# Patient Record
Sex: FEMALE | Race: WHITE | NOT HISPANIC OR LATINO | Employment: FULL TIME | URBAN - METROPOLITAN AREA
[De-identification: names, ages, dates, MRNs, and addresses within clinical notes are randomized per-mention and may not be internally consistent; named-entity substitution may affect disease eponyms.]

---

## 2019-02-18 ENCOUNTER — RECORDS - HEALTHEAST (OUTPATIENT)
Dept: LAB | Facility: HOSPITAL | Age: 49
End: 2019-02-18

## 2019-02-18 LAB
ANION GAP SERPL CALCULATED.3IONS-SCNC: 9 MMOL/L (ref 5–18)
BUN SERPL-MCNC: 13 MG/DL (ref 8–22)
CALCIUM SERPL-MCNC: 9.3 MG/DL (ref 8.5–10.5)
CHLORIDE BLD-SCNC: 108 MMOL/L (ref 98–107)
CO2 SERPL-SCNC: 23 MMOL/L (ref 22–31)
CREAT SERPL-MCNC: 0.75 MG/DL (ref 0.6–1.1)
GFR SERPL CREATININE-BSD FRML MDRD: >60 ML/MIN/1.73M2
GLUCOSE BLD-MCNC: 88 MG/DL (ref 70–125)
POTASSIUM BLD-SCNC: 3.4 MMOL/L (ref 3.5–5)
SODIUM SERPL-SCNC: 140 MMOL/L (ref 136–145)

## 2019-02-20 LAB — TOPIRAMATE SERPL-MCNC: 3.9 UG/ML (ref 5–20)

## 2021-06-01 ENCOUNTER — RECORDS - HEALTHEAST (OUTPATIENT)
Dept: ADMINISTRATIVE | Facility: CLINIC | Age: 51
End: 2021-06-01

## 2021-06-02 ENCOUNTER — RECORDS - HEALTHEAST (OUTPATIENT)
Dept: ADMINISTRATIVE | Facility: CLINIC | Age: 51
End: 2021-06-02

## 2021-07-18 ENCOUNTER — OFFICE VISIT (OUTPATIENT)
Dept: FAMILY MEDICINE | Facility: CLINIC | Age: 51
End: 2021-07-18
Payer: COMMERCIAL

## 2021-07-18 VITALS
OXYGEN SATURATION: 95 % | SYSTOLIC BLOOD PRESSURE: 101 MMHG | DIASTOLIC BLOOD PRESSURE: 68 MMHG | WEIGHT: 164.56 LBS | HEART RATE: 100 BPM | TEMPERATURE: 98.9 F

## 2021-07-18 DIAGNOSIS — M25.561 ACUTE PAIN OF RIGHT KNEE: Primary | ICD-10-CM

## 2021-07-18 PROCEDURE — 99203 OFFICE O/P NEW LOW 30 MIN: CPT | Performed by: STUDENT IN AN ORGANIZED HEALTH CARE EDUCATION/TRAINING PROGRAM

## 2021-07-18 NOTE — PROGRESS NOTES
There are no exam notes on file for this visit.  Chief Complaint   Patient presents with     Knee Pain     R knee pain since this morning, swelling, no known injury     Blood pressure 101/68, pulse 100, temperature 98.9  F (37.2  C), temperature source Oral, weight 74.6 kg (164 lb 9 oz), SpO2 95 %.           SUBJECTIVE       Mindy is a 50 year old  female with a PMH significant for:   There is no problem list on file for this patient.    She presents with sudden onset, sharp right knee pain, first noticed this morning.  Symptoms have gradually gotten worse all day.  Denies any trauma or inciting events.  Has had knee pains in the past, but none this severe.  Describes playing catcher in softball and states that her knees hurt when the weather changes.  Has not tried anything for her symptoms.  Denies any numbness, tingling, weakness.  Does describe some bone-on-bone action with her patella.  Pain is worse on the medial posterior right knee.  Denies any fevers or chills.  No history of gout in the family.          OBJECTIVE     Vitals:    07/18/21 1750   BP: 101/68   BP Location: Left arm   Patient Position: Sitting   Cuff Size: Adult Regular   Pulse: 100   Temp: 98.9  F (37.2  C)   TempSrc: Oral   SpO2: 95%   Weight: 74.6 kg (164 lb 9 oz)     There is no height or weight on file to calculate BMI.    Constitutional: Awake, alert, cooperative, no acute distress, and appears stated age.  Musculoskeletal: Tenderness to palpation at the right patella and right joint line.  Medial and lateral ligaments intact.  Full right knee flexion/extension.  Strength intact.  Sensations intact.  Unsteady gait secondary to pain.  2+ DP pulses on the right.  Neuropsychiatric: Normal affect, mood, orientation, memory and insight.  Skin: No visible rashes, erythema, pallor, petechia or purpura.      ASSESSMENT AND PLAN     Mindy was seen today for knee pain.    Diagnoses and all orders for this visit:    Acute pain of right knee  Likely  due to soft tissue, ligament versus meniscus versus muscle injury.  Does not appear to be infected at this time.  Recommend rest, ice, compression, elevation in addition to Tylenol/ibuprofen and over-the-counter topicals as needed for pain control.  Recommend follow-up with PCP for further management and evaluation.  Consider physical therapy, and steroid injection, MRI.  Return precautions provided.        Return if symptoms worsen or fail to improve.    Malcolm Aguilar MD  7/18/2021

## 2023-05-10 ENCOUNTER — APPOINTMENT (OUTPATIENT)
Dept: CT IMAGING | Facility: HOSPITAL | Age: 53
End: 2023-05-10
Attending: EMERGENCY MEDICINE
Payer: COMMERCIAL

## 2023-05-10 ENCOUNTER — HOSPITAL ENCOUNTER (EMERGENCY)
Facility: HOSPITAL | Age: 53
Discharge: HOME | End: 2023-05-10
Attending: EMERGENCY MEDICINE | Admitting: EMERGENCY MEDICINE
Payer: COMMERCIAL

## 2023-05-10 VITALS
DIASTOLIC BLOOD PRESSURE: 74 MMHG | TEMPERATURE: 97.7 F | RESPIRATION RATE: 16 BRPM | OXYGEN SATURATION: 97 % | WEIGHT: 170 LBS | HEART RATE: 79 BPM | SYSTOLIC BLOOD PRESSURE: 110 MMHG

## 2023-05-10 DIAGNOSIS — E27.8 MASS OF BOTH ADRENAL GLANDS (H): ICD-10-CM

## 2023-05-10 DIAGNOSIS — N20.1 CALCULUS OF PROXIMAL RIGHT URETER: ICD-10-CM

## 2023-05-10 LAB
ALBUMIN SERPL BCG-MCNC: 4.2 G/DL (ref 3.5–5.2)
ALBUMIN UR-MCNC: 20 MG/DL
ALP SERPL-CCNC: 63 U/L (ref 35–104)
ALT SERPL W P-5'-P-CCNC: 14 U/L (ref 10–35)
ANION GAP SERPL CALCULATED.3IONS-SCNC: 12 MMOL/L (ref 7–15)
APPEARANCE UR: ABNORMAL
AST SERPL W P-5'-P-CCNC: 22 U/L (ref 10–35)
BILIRUB DIRECT SERPL-MCNC: <0.2 MG/DL (ref 0–0.3)
BILIRUB SERPL-MCNC: 0.3 MG/DL
BILIRUB UR QL STRIP: NEGATIVE
BUN SERPL-MCNC: 16.5 MG/DL (ref 6–20)
CALCIUM SERPL-MCNC: 9.4 MG/DL (ref 8.6–10)
CHLORIDE SERPL-SCNC: 106 MMOL/L (ref 98–107)
COLOR UR AUTO: YELLOW
CREAT SERPL-MCNC: 1.03 MG/DL (ref 0.51–0.95)
CRP SERPL-MCNC: <3 MG/L
DEPRECATED HCO3 PLAS-SCNC: 22 MMOL/L (ref 22–29)
ERYTHROCYTE [DISTWIDTH] IN BLOOD BY AUTOMATED COUNT: 13.2 % (ref 10–15)
GFR SERPL CREATININE-BSD FRML MDRD: 65 ML/MIN/1.73M2
GLUCOSE SERPL-MCNC: 121 MG/DL (ref 70–99)
GLUCOSE UR STRIP-MCNC: NEGATIVE MG/DL
HCT VFR BLD AUTO: 46.8 % (ref 35–47)
HGB BLD-MCNC: 15.9 G/DL (ref 11.7–15.7)
HGB UR QL STRIP: ABNORMAL
KETONES UR STRIP-MCNC: NEGATIVE MG/DL
LEUKOCYTE ESTERASE UR QL STRIP: NEGATIVE
LIPASE SERPL-CCNC: 23 U/L (ref 13–60)
MCH RBC QN AUTO: 31.3 PG (ref 26.5–33)
MCHC RBC AUTO-ENTMCNC: 34 G/DL (ref 31.5–36.5)
MCV RBC AUTO: 92 FL (ref 78–100)
MUCOUS THREADS #/AREA URNS LPF: PRESENT /LPF
NITRATE UR QL: NEGATIVE
PH UR STRIP: 7 [PH] (ref 5–7)
PLATELET # BLD AUTO: 323 10E3/UL (ref 150–450)
POTASSIUM SERPL-SCNC: 4.5 MMOL/L (ref 3.4–5.3)
PROT SERPL-MCNC: 7.1 G/DL (ref 6.4–8.3)
RBC # BLD AUTO: 5.08 10E6/UL (ref 3.8–5.2)
RBC URINE: >182 /HPF
SODIUM SERPL-SCNC: 140 MMOL/L (ref 136–145)
SP GR UR STRIP: 1.03 (ref 1–1.03)
SQUAMOUS EPITHELIAL: 12 /HPF
UROBILINOGEN UR STRIP-MCNC: <2 MG/DL
WBC # BLD AUTO: 14.4 10E3/UL (ref 4–11)
WBC URINE: 0 /HPF

## 2023-05-10 PROCEDURE — 82248 BILIRUBIN DIRECT: CPT | Performed by: EMERGENCY MEDICINE

## 2023-05-10 PROCEDURE — 36415 COLL VENOUS BLD VENIPUNCTURE: CPT | Performed by: EMERGENCY MEDICINE

## 2023-05-10 PROCEDURE — 86140 C-REACTIVE PROTEIN: CPT | Performed by: EMERGENCY MEDICINE

## 2023-05-10 PROCEDURE — 85027 COMPLETE CBC AUTOMATED: CPT | Performed by: EMERGENCY MEDICINE

## 2023-05-10 PROCEDURE — 96375 TX/PRO/DX INJ NEW DRUG ADDON: CPT

## 2023-05-10 PROCEDURE — 99285 EMERGENCY DEPT VISIT HI MDM: CPT | Mod: 25

## 2023-05-10 PROCEDURE — 83690 ASSAY OF LIPASE: CPT | Performed by: EMERGENCY MEDICINE

## 2023-05-10 PROCEDURE — 250N000013 HC RX MED GY IP 250 OP 250 PS 637: Performed by: EMERGENCY MEDICINE

## 2023-05-10 PROCEDURE — 80053 COMPREHEN METABOLIC PANEL: CPT | Performed by: EMERGENCY MEDICINE

## 2023-05-10 PROCEDURE — 81001 URINALYSIS AUTO W/SCOPE: CPT | Performed by: EMERGENCY MEDICINE

## 2023-05-10 PROCEDURE — 258N000003 HC RX IP 258 OP 636: Performed by: EMERGENCY MEDICINE

## 2023-05-10 PROCEDURE — 250N000011 HC RX IP 250 OP 636: Performed by: EMERGENCY MEDICINE

## 2023-05-10 PROCEDURE — 96361 HYDRATE IV INFUSION ADD-ON: CPT

## 2023-05-10 PROCEDURE — 96374 THER/PROPH/DIAG INJ IV PUSH: CPT | Mod: 59

## 2023-05-10 PROCEDURE — 74177 CT ABD & PELVIS W/CONTRAST: CPT

## 2023-05-10 RX ORDER — DIMENHYDRINATE 50 MG
50 TABLET,CHEWABLE ORAL EVERY 8 HOURS PRN
Qty: 21 TABLET | Refills: 0 | Status: SHIPPED | OUTPATIENT
Start: 2023-05-10 | End: 2023-08-09

## 2023-05-10 RX ORDER — ACETAMINOPHEN 325 MG/1
975 TABLET ORAL ONCE
Status: COMPLETED | OUTPATIENT
Start: 2023-05-10 | End: 2023-05-10

## 2023-05-10 RX ORDER — TAMSULOSIN HYDROCHLORIDE 0.4 MG/1
0.4 CAPSULE ORAL DAILY
Qty: 7 CAPSULE | Refills: 0 | Status: SHIPPED | OUTPATIENT
Start: 2023-05-10 | End: 2023-05-17

## 2023-05-10 RX ORDER — ACETAMINOPHEN 500 MG
1000 TABLET ORAL EVERY 6 HOURS PRN
Qty: 28 TABLET | Refills: 0 | Status: SHIPPED | OUTPATIENT
Start: 2023-05-10 | End: 2023-08-09

## 2023-05-10 RX ORDER — KETOROLAC TROMETHAMINE 15 MG/ML
15 INJECTION, SOLUTION INTRAMUSCULAR; INTRAVENOUS ONCE
Status: COMPLETED | OUTPATIENT
Start: 2023-05-10 | End: 2023-05-10

## 2023-05-10 RX ORDER — OXYCODONE HYDROCHLORIDE 5 MG/1
5 TABLET ORAL EVERY 6 HOURS PRN
Qty: 12 TABLET | Refills: 0 | Status: SHIPPED | OUTPATIENT
Start: 2023-05-10 | End: 2023-05-15

## 2023-05-10 RX ORDER — IOPAMIDOL 755 MG/ML
90 INJECTION, SOLUTION INTRAVASCULAR ONCE
Status: COMPLETED | OUTPATIENT
Start: 2023-05-10 | End: 2023-05-10

## 2023-05-10 RX ORDER — IBUPROFEN 600 MG/1
600 TABLET, FILM COATED ORAL EVERY 6 HOURS PRN
Qty: 28 TABLET | Refills: 0 | Status: SHIPPED | OUTPATIENT
Start: 2023-05-10 | End: 2023-08-09

## 2023-05-10 RX ADMIN — Medication 50 MG: at 13:55

## 2023-05-10 RX ADMIN — HYDROMORPHONE HYDROCHLORIDE 1 MG: 1 INJECTION, SOLUTION INTRAMUSCULAR; INTRAVENOUS; SUBCUTANEOUS at 13:55

## 2023-05-10 RX ADMIN — ACETAMINOPHEN 975 MG: 325 TABLET ORAL at 12:11

## 2023-05-10 RX ADMIN — IOPAMIDOL 90 ML: 755 INJECTION, SOLUTION INTRAVENOUS at 13:11

## 2023-05-10 RX ADMIN — SODIUM CHLORIDE 500 ML: 9 INJECTION, SOLUTION INTRAVENOUS at 13:54

## 2023-05-10 RX ADMIN — KETOROLAC TROMETHAMINE 15 MG: 15 INJECTION, SOLUTION INTRAMUSCULAR; INTRAVENOUS at 12:11

## 2023-05-10 ASSESSMENT — ACTIVITIES OF DAILY LIVING (ADL): ADLS_ACUITY_SCORE: 35

## 2023-05-10 NOTE — DISCHARGE INSTRUCTIONS
Please follow-up with the [facility] - they should call you to arrange appointment.    Return to the ER for worsening symptoms, worsening pain, persistent nausea or vomiting, inability to empty your bladder, fever or other concerns.    Do not drink alcohol or drive while using the oxycodone.

## 2023-05-10 NOTE — ED TRIAGE NOTES
Pt presents to triage ambulatory for flank pain. Pt reports sudden onset of severe R flank pain started around 0300 am. Pt reports blood in urine and increase urination. No hx of kidney stones. Hx of appendectomy. +N/V.

## 2023-05-10 NOTE — ED PROVIDER NOTES
Emergency Department Encounter     Evaluation Date & Time:   5/10/2023 11:44 AM    CHIEF COMPLAINT:  Flank Pain      Triage Note:Pt presents to triage ambulatory for flank pain. Pt reports sudden onset of severe R flank pain started around 0300 am. Pt reports blood in urine and increase urination. No hx of kidney stones. Hx of appendectomy. +N/V.          Impression and Plan       FINAL IMPRESSION:    ICD-10-CM    1. Calculus of proximal right ureter  N20.1 Adult Urology  Referral      2. Mass of both adrenal glands (H)  E27.8           ED COURSE & MEDICAL DECISION MAKIN:58 AM I met with the patient, obtained history, performed an initial exam, and discussed options and plan for diagnostics and treatment here in the ED.  2:44 PM We discussed the plan for discharge and the patient is agreeable. Reviewed supportive cares, symptomatic treatment, outpatient follow up, and reasons to return to the Emergency Department. Patient to be discharged by ED RN.     52 year old female, history of s/p appendectomy, adenomatous polyp of colon and tobacco use, who presents for evaluation of intense right flank pain that has been constant since waking her from sleep ~3am associated with multiple episodes of vomiting. She did note some hematuria this morning without other urinary symptoms. No fevers or chills.     On exam, abdomen is soft with mild tenderness to palpation RLQ and mild CVAT, right.    IV access established and blood sent for labs.  Patient given IV Toradol and po Tylenol with some improvement.  Patient then given Dramamine and Dilaudid with improvement.    Labs remarkable for leukocytosis (WBC 14.4) with normal CRP (<3).  She has polycythemia (Hb 15.9) and mild renal insufficiency (creatinine 1.03 with GFR 65) for which she was given IV fluids. No significant electrolyte derangements.  No laboratory evidence of hepatitis, biliary obstruction or pancreatitis.    UA contaminated with blood and no  Mom notified   suggestion of infection.    CT abdomen / pelvis demonstrated:  1.  Right proximal ureteral 5 mm stone with hydronephrosis.  2.  Bilateral indeterminate adrenal masses. MRI recommended.  3.  Probable multiple leiomyomas.    Patient discharged to home with follow-up with [facility] (a referral was ordered).  She was given prescriptions for ibuprofen, Tylenol, Dramamine, Flomax (given proximal location of stone) and oxycodone.  Return precautions provided.  Patient stable throughout ED course.    I phoned the patient at home to inform her of the adrenal masses incidentally seen on her imaging tonight and that she should arrange for further follow-up with her PCP for MRI.      At the conclusion of the encounter I discussed the results of all the tests and the disposition. The questions were answered. The patient acknowledged understanding and was agreeable with the care plan.      Medical Decision Making    History:    Supplemental history from: Documented in chart, if applicable    External Record(s) reviewed: Documented in chart, if applicable. and Prior Imaging: from 3/2012 - demonstrated left adrenal mass    Work Up:    Chart documentation includes differential considered and any EKGs or imaging independently interpreted by provider, where specified.    In additional to work up documented, I considered the following work up: Documented in chart, if applicable.    External consultation:    Discussion of management with another provider: Documented in chart, if applicable    Complicating factors:    Care impacted by chronic illness: Smoking / Nicotine Use    Care affected by social determinants of health: N/A    Disposition considerations: Discharge. I prescribed additional prescription strength medication(s) as charted. I considered admission, but ultimately discharged patient Given reassuring evaluation and clinical improvement.      MEDICATIONS GIVEN IN THE EMERGENCY DEPARTMENT:  Medications   ketorolac (TORADOL)  "injection 15 mg (15 mg Intravenous $Given 5/10/23 1211)   acetaminophen (TYLENOL) tablet 975 mg (975 mg Oral $Given 5/10/23 1211)   iopamidol (ISOVUE-370) solution 90 mL (90 mLs Intravenous $Given 5/10/23 1311)   HYDROmorphone (DILAUDID) injection 1 mg (1 mg Intravenous $Given 5/10/23 1355)   dimenhyDRINATE chew tab 50 mg (50 mg Oral $Given 5/10/23 1355)   0.9% sodium chloride BOLUS (0 mLs Intravenous Stopped 5/10/23 1441)       NEW PRESCRIPTIONS STARTED AT TODAY'S ED VISIT:  Discharge Medication List as of 5/10/2023  2:48 PM      START taking these medications    Details   acetaminophen (TYLENOL) 500 MG tablet Take 2 tablets (1,000 mg) by mouth every 6 hours as needed for pain, Disp-28 tablet, R-0, Local Print      dimenhyDRINATE (DRAMAMINE) 50 MG CHEW Take 1 tablet (50 mg) by mouth every 8 hours as needed for other (kidney stone pain), Disp-21 tablet, R-0, Local Print      ibuprofen (ADVIL/MOTRIN) 600 MG tablet Take 1 tablet (600 mg) by mouth every 6 hours as needed for moderate pain, Disp-28 tablet, R-0, Local Print      oxyCODONE (ROXICODONE) 5 MG tablet Take 1 tablet (5 mg) by mouth every 6 hours as needed for pain or moderate to severe pain, Disp-12 tablet, R-0, Local Print      tamsulosin (FLOMAX) 0.4 MG capsule Take 1 capsule (0.4 mg) by mouth daily for 7 doses, Disp-7 capsule, R-0, Local Print             HPI     HPI     Mindy Guardado is a 52 year old female, history of s/p appendectomy, adenomatous polyp of colon and tobacco use, who presents to this ED by walk-in for evaluation of flank pain.    The pain awoke the patient from sleep at 3:00 AM (~9 hours ago) and has been constant since onset. Her pain is located to her right flank and radiates toward her right abdomen. She describes her pain as \"intense.\" She denies provocative features. She endorses multiple (>12) episodes of vomiting. No diarrhea or constipation. The patient notes some hematuria this morning, but denies dysuria, urinary frequency and " urinary retention. No fevers or chills.     She has otherwise been in her usual state of health and denies chest pain, shortness of breath, cough or other concerns.     She is post-menopausal.     REVIEW OF SYSTEMS:  All other systems reviewed and are negative.      Medical History     History reviewed. No pertinent past medical history.    History reviewed. No pertinent surgical history.    History reviewed. No pertinent family history.    Social History     Tobacco Use     Smoking status: Every Day     Smokeless tobacco: Never       acetaminophen (TYLENOL) 500 MG tablet  dimenhyDRINATE (DRAMAMINE) 50 MG CHEW  ibuprofen (ADVIL/MOTRIN) 600 MG tablet  oxyCODONE (ROXICODONE) 5 MG tablet  tamsulosin (FLOMAX) 0.4 MG capsule        Physical Exam     First Vitals:  Patient Vitals for the past 24 hrs:   BP Temp Temp src Pulse Resp SpO2 Weight   05/10/23 1430 110/74 -- -- 79 -- 97 % --   05/10/23 1427 110/74 -- -- 74 -- 96 % --   05/10/23 1415 108/67 -- -- 78 -- 95 % --   05/10/23 1400 121/67 -- -- 82 16 99 % --   05/10/23 1345 114/65 -- -- 81 -- 97 % --   05/10/23 1330 114/68 -- -- 77 -- 97 % --   05/10/23 1315 128/76 -- -- -- -- -- --   05/10/23 1245 110/70 -- -- 82 -- 93 % --   05/10/23 1230 111/77 -- -- 80 -- 97 % --   05/10/23 1215 115/86 -- -- 83 -- 98 % --   05/10/23 1200 103/55 -- -- 88 -- 95 % --   05/10/23 1140 (!) 143/78 97.7  F (36.5  C) Oral 96 16 96 % 77.1 kg (170 lb)       PHYSICAL EXAM:   Physical Exam    GENERAL: Awake, alert.  In moderate acute distress secondary to flank pain.   HEENT: Normocephalic, atraumatic.  NECK: No stridor.  PULMONARY: Symmetrical breath sounds without distress.  Lungs clear to auscultation bilaterally without wheezes, rhonchi or rales.  CARDIO: Regular rate and rhythm.  No significant murmur, rub or gallop.  Radial pulses strong and symmetrical.  ABDOMINAL: Abdomen soft, non-distended with mild tenderness to palpation RLQ; no rebound tenderness or guarding. Mild CVAT, right; no  CVAT, left.  EXTREMITIES: No lower extremity swelling or edema.      NEURO: Alert and oriented to person, place and time.  Cranial nerves grossly intact.  No focal motor deficit.  PSYCH: Normal mood and affect.      Results     LAB:  All pertinent labs reviewed and interpreted  Labs Ordered and Resulted from Time of ED Arrival to Time of ED Departure   CBC WITH PLATELETS - Abnormal       Result Value    WBC Count 14.4 (*)     RBC Count 5.08      Hemoglobin 15.9 (*)     Hematocrit 46.8      MCV 92      MCH 31.3      MCHC 34.0      RDW 13.2      Platelet Count 323     BASIC METABOLIC PANEL - Abnormal    Sodium 140      Potassium 4.5      Chloride 106      Carbon Dioxide (CO2) 22      Anion Gap 12      Urea Nitrogen 16.5      Creatinine 1.03 (*)     Calcium 9.4      Glucose 121 (*)     GFR Estimate 65     ROUTINE UA WITH MICROSCOPIC REFLEX TO CULTURE - Abnormal    Color Urine Yellow      Appearance Urine Turbid (*)     Glucose Urine Negative      Bilirubin Urine Negative      Ketones Urine Negative      Specific Gravity Urine 1.032 (*)     Blood Urine >1.0 mg/dL (*)     pH Urine 7.0      Protein Albumin Urine 20 (*)     Urobilinogen Urine <2.0      Nitrite Urine Negative      Leukocyte Esterase Urine Negative      Mucus Urine Present (*)     RBC Urine >182 (*)     WBC Urine 0      Squamous Epithelials Urine 12 (*)    HEPATIC FUNCTION PANEL - Normal    Protein Total 7.1      Albumin 4.2      Bilirubin Total 0.3      Alkaline Phosphatase 63      AST 22      ALT 14      Bilirubin Direct <0.20     LIPASE - Normal    Lipase 23     CRP INFLAMMATION - Normal    CRP Inflammation <3.00         RADIOLOGY:  CT Abdomen Pelvis w Contrast   Final Result   IMPRESSION:    1.  Right proximal ureteral 5 mm stone with hydronephrosis.   2.  Bilateral indeterminate adrenal masses. MRI recommended.   3.  Probable multiple leiomyomas.          Song KUMARI Cha am serving as a scribe to document services personally performed by Becky Marks  MD based on my observation and the provider's statements to me. I, Becky Marks MD attest that Song Palencia is acting in a scribe capacity, has observed my performance of the services and has documented them in accordance with my direction.    Becky Marks MD  Emergency Medicine  [facility] EMERGENCY DEPARTMENT         Becky Marks MD  05/10/23 2034

## 2023-05-11 ENCOUNTER — VIRTUAL VISIT (OUTPATIENT)
Dept: UROLOGY | Facility: CLINIC | Age: 53
End: 2023-05-11
Payer: COMMERCIAL

## 2023-05-11 VITALS — WEIGHT: 170 LBS | BODY MASS INDEX: 31.28 KG/M2 | HEIGHT: 62 IN

## 2023-05-11 DIAGNOSIS — N20.1 CALCULUS OF PROXIMAL RIGHT URETER: ICD-10-CM

## 2023-05-11 PROCEDURE — 99204 OFFICE O/P NEW MOD 45 MIN: CPT | Mod: VID | Performed by: STUDENT IN AN ORGANIZED HEALTH CARE EDUCATION/TRAINING PROGRAM

## 2023-05-11 RX ORDER — CEFAZOLIN SODIUM 2 G/50ML
2 SOLUTION INTRAVENOUS SEE ADMIN INSTRUCTIONS
Status: CANCELLED | OUTPATIENT
Start: 2023-05-11

## 2023-05-11 RX ORDER — CEFAZOLIN SODIUM 2 G/50ML
2 SOLUTION INTRAVENOUS
Status: CANCELLED | OUTPATIENT
Start: 2023-05-11

## 2023-05-11 ASSESSMENT — PAIN SCALES - GENERAL: PAINLEVEL: NO PAIN (0)

## 2023-05-11 NOTE — LETTER
5/11/2023       RE: Mindy Guardado  [address] [place] [address]     Dear Colleague,    Thank you for referring your patient, Mindy Guardado, to the [facility] [place] [facility]. Please see a copy of my visit note below.    **TEXT LINK TO CELL, [phone]**      Mindy is a 52 year old who is being evaluated via a billable video visit.      How would you like to obtain your AVS? Mail    If the video visit is dropped, the invitation should be resent by: Text to cell phone:     Will anyone else be joining your video visit? No           Video-Visit Details    Type of service:  Video Visit  Video start time: 3:01 PM  Video end time: 3:14 PM    Originating Location (pt. Location): Home    Distant Location (provider location):  On-site  Platform used for Video Visit: Flor         Chief Complaint:    Kidney/Ureteral Stone           Consult or Referral:     Mr. Mindy Guardado is a 52 year old female seen at the request of Dr. Marks.         History of Present Illness:    Mindy Guardado is a very pleasant 52 year old female who presents with a history of Kidney/Ureteral Stone.      Reviewed previous notes from Dr. Selina Guillen presents today after recent ER visit due to ureteral colic on the right side  She has no prior history of kidney or ureteral stones  She is not currently taking any medications apart from pain medications and tamsulosin  No history of urinary tract infection  She needs regular pain medications             Past Medical History:   History reviewed. No pertinent past medical history.         Past Surgical History:   History reviewed. No pertinent surgical history.         Medications     Current Outpatient Medications   Medication    acetaminophen (TYLENOL) 500 MG tablet    dimenhyDRINATE (DRAMAMINE) 50 MG CHEW    ibuprofen (ADVIL/MOTRIN) 600 MG tablet    oxyCODONE (ROXICODONE) 5 MG tablet    tamsulosin  (FLOMAX) 0.4 MG capsule     No current facility-administered medications for this visit.            Family History:   History reviewed. No pertinent family history.         Social History:     Social History     Socioeconomic History    Marital status:      Spouse name: Not on file    Number of children: Not on file    Years of education: Not on file    Highest education level: Not on file   Occupational History    Not on file   Tobacco Use    Smoking status: Every Day    Smokeless tobacco: Never   Vaping Use    Vaping status: Not on file   Substance and Sexual Activity    Alcohol use: Not on file    Drug use: Not on file    Sexual activity: Not on file   Other Topics Concern    Not on file   Social History Narrative    Not on file     Social Determinants of Health     Financial Resource Strain: Not on file   Food Insecurity: Not on file   Transportation Needs: Not on file   Physical Activity: Not on file   Stress: Not on file   Social Connections: Not on file   Intimate Partner Violence: Not on file   Housing Stability: Not on file            Allergies:   Penicillins         Review of Systems:  From intake questionnaire     Skin: negative  Eyes: negative  Ears/Nose/Throat: negative  Respiratory: No shortness of breath, dyspnea on exertion, cough, or hemoptysis  Cardiovascular: No chest pain or palpitations  Gastrointestinal: negative; no nausea/vomiting, constipation or diarrhea  Genitourinary: as per HPI  Musculoskeletal: negative  Neurologic: negative  Psychiatric: negative  Hematologic/Lymphatic/Immunologic: negative  Endocrine: negative         Physical Exam:   This is a virtual visit    Alert, no acute distress, oriented, conversant    Ears/nose/mouth: mouth:normal, good dentition  Respiratory: no respiratory distress, or pursed lip breathing  Cardiovascular:no obvious jugular venous distension present  Skin: no suspicious lesions or rashes on Visible body parts on the Screen  Neuro: Alert, oriented,  speech and mentation normal  Psych: affect and mood normal, alert and oriented to person, place and time      Labs and Pathology:    The following labs were reviewed by me and discussed with the patient:  Creatinine: Normal  UA:  Hematuria present  Significant for   Lab Results   Component Value Date    CR 1.03 05/10/2023    CR 0.75 02/18/2019     No results found for: PSA          Imaging:    The following imaging exams were independently viewed and interpreted by me and discussed with patient:  CT Scan Abd/Pelvis: Abnormal:   Right mild hydro ureteral nephrosis with a proximal ureteral calculus measuring 7 mm in greatest dimension             Assessment and Plan:     Assessment:     Calculus of proximal right ureter  Discussed in detail about the significance of the ureteral stone and the need for intervention considering the large size stone and her persistent pain  Recommended that she continue taking tamsulosin for now and will have her scheduled for the ureteroscopy  We discussed in detail about the procedure of the ureteroscopy and the need for a stent placement after removal of the stone.  We also discussed about stent related discomfort and follow-up visit for stent removal in the clinic  Postoperative outcomes were discussed including the possibilities of urinary tract infection, hematuria, stent related discomfort  Based on the discussions we had she was agreeable to proceed ahead with this scheduling of surgery  She will continue to strain her urine and inform us earlier if she is able to pass the stone on her own    - Adult Urology  Referral  - Case Request: CYSTOURETEROSCOPY, WITH Retrograde Pyelogram Laser Lithotripsy CALCULUS REMOVAL AND URETERAL STENT INSERTION; Standing  - Case Request: CYSTOURETEROSCOPY, WITH Retrograde Pyelogram Laser Lithotripsy CALCULUS REMOVAL AND URETERAL STENT INSERTION    Plan:  Scheduled for surgery    Orders  Orders Placed This Encounter   Procedures    Case  Request: CYSTOURETEROSCOPY, WITH Retrograde Pyelogram Laser Lithotripsy CALCULUS REMOVAL AND URETERAL STENT INSERTION         Ron Treviño MD  [facility] UROLOGY [facility]IN[facility] [place]                  ==========================    Additional Billing and Coding Information:  Review of external notes as documented above   Review of the result(s) of each unique test - UA, creatinine    Independent interpretation of a test performed by another physician/other qualified health care professional (not separately reported) -   CT abdomen pelvis    Discussion of management or test interpretation with external physician/other qualified healthcare professional/appropriate source -           20 minutes spent by me on the date of the encounter doing chart review, review of test results, interpretation of tests, patient visit and documentation     ==========================

## 2023-05-11 NOTE — PROGRESS NOTES
**TEXT LINK TO CELL, [phone]**      Mindy is a 52 year old who is being evaluated via a billable video visit.      How would you like to obtain your AVS? Mail    If the video visit is dropped, the invitation should be resent by: Text to cell phone:     Will anyone else be joining your video visit? No           Video-Visit Details    Type of service:  Video Visit  Video start time: 3:01 PM  Video end time: 3:14 PM    Originating Location (pt. Location): Home    Distant Location (provider location):  On-site  Platform used for Video Visit: Bahu         Chief Complaint:    Kidney/Ureteral Stone           Consult or Referral:     Mr. Mindy Guardado is a 52 year old female seen at the request of Dr. Marks.         History of Present Illness:    Mindy Guardado is a very pleasant 52 year old female who presents with a history of Kidney/Ureteral Stone.      Reviewed previous notes from Dr. Selina Guillen presents today after recent ER visit due to ureteral colic on the right side  She has no prior history of kidney or ureteral stones  She is not currently taking any medications apart from pain medications and tamsulosin  No history of urinary tract infection  She needs regular pain medications             Past Medical History:   History reviewed. No pertinent past medical history.         Past Surgical History:   History reviewed. No pertinent surgical history.         Medications     Current Outpatient Medications   Medication     acetaminophen (TYLENOL) 500 MG tablet     dimenhyDRINATE (DRAMAMINE) 50 MG CHEW     ibuprofen (ADVIL/MOTRIN) 600 MG tablet     oxyCODONE (ROXICODONE) 5 MG tablet     tamsulosin (FLOMAX) 0.4 MG capsule     No current facility-administered medications for this visit.            Family History:   History reviewed. No pertinent family history.         Social History:     Social History     Socioeconomic History     Marital status:      Spouse name: Not on file     Number of  children: Not on file     Years of education: Not on file     Highest education level: Not on file   Occupational History     Not on file   Tobacco Use     Smoking status: Every Day     Smokeless tobacco: Never   Vaping Use     Vaping status: Not on file   Substance and Sexual Activity     Alcohol use: Not on file     Drug use: Not on file     Sexual activity: Not on file   Other Topics Concern     Not on file   Social History Narrative     Not on file     Social Determinants of Health     Financial Resource Strain: Not on file   Food Insecurity: Not on file   Transportation Needs: Not on file   Physical Activity: Not on file   Stress: Not on file   Social Connections: Not on file   Intimate Partner Violence: Not on file   Housing Stability: Not on file            Allergies:   Penicillins         Review of Systems:  From intake questionnaire     Skin: negative  Eyes: negative  Ears/Nose/Throat: negative  Respiratory: No shortness of breath, dyspnea on exertion, cough, or hemoptysis  Cardiovascular: No chest pain or palpitations  Gastrointestinal: negative; no nausea/vomiting, constipation or diarrhea  Genitourinary: as per HPI  Musculoskeletal: negative  Neurologic: negative  Psychiatric: negative  Hematologic/Lymphatic/Immunologic: negative  Endocrine: negative         Physical Exam:   This is a virtual visit    Alert, no acute distress, oriented, conversant    Ears/nose/mouth: mouth:normal, good dentition  Respiratory: no respiratory distress, or pursed lip breathing  Cardiovascular:no obvious jugular venous distension present  Skin: no suspicious lesions or rashes on Visible body parts on the Screen  Neuro: Alert, oriented, speech and mentation normal  Psych: affect and mood normal, alert and oriented to person, place and time      Labs and Pathology:    The following labs were reviewed by me and discussed with the patient:  Creatinine: Normal  UA:  Hematuria present  Significant for   Lab Results   Component  Value Date    CR 1.03 05/10/2023    CR 0.75 02/18/2019     No results found for: PSA          Imaging:    The following imaging exams were independently viewed and interpreted by me and discussed with patient:  CT Scan Abd/Pelvis: Abnormal:   Right mild hydro ureteral nephrosis with a proximal ureteral calculus measuring 7 mm in greatest dimension             Assessment and Plan:     Assessment:     Calculus of proximal right ureter  Discussed in detail about the significance of the ureteral stone and the need for intervention considering the large size stone and her persistent pain  Recommended that she continue taking tamsulosin for now and will have her scheduled for the ureteroscopy  We discussed in detail about the procedure of the ureteroscopy and the need for a stent placement after removal of the stone.  We also discussed about stent related discomfort and follow-up visit for stent removal in the clinic  Postoperative outcomes were discussed including the possibilities of urinary tract infection, hematuria, stent related discomfort  Based on the discussions we had she was agreeable to proceed ahead with this scheduling of surgery  She will continue to strain her urine and inform us earlier if she is able to pass the stone on her own    - Adult Urology  Referral  - Case Request: CYSTOURETEROSCOPY, WITH Retrograde Pyelogram Laser Lithotripsy CALCULUS REMOVAL AND URETERAL STENT INSERTION; Standing  - Case Request: CYSTOURETEROSCOPY, WITH Retrograde Pyelogram Laser Lithotripsy CALCULUS REMOVAL AND URETERAL STENT INSERTION    Plan:  Scheduled for surgery    Orders  Orders Placed This Encounter   Procedures     Case Request: CYSTOURETEROSCOPY, WITH Retrograde Pyelogram Laser Lithotripsy CALCULUS REMOVAL AND URETERAL STENT INSERTION         Ron Treviño MD  [facility] UROLOGY [facility]                  ==========================    Additional Billing and Coding Information:  Review of  external notes as documented above   Review of the result(s) of each unique test - UA, creatinine    Independent interpretation of a test performed by another physician/other qualified health care professional (not separately reported) -   CT abdomen pelvis    Discussion of management or test interpretation with external physician/other qualified healthcare professional/appropriate source -           20 minutes spent by me on the date of the encounter doing chart review, review of test results, interpretation of tests, patient visit and documentation     ==========================

## 2023-05-12 ENCOUNTER — TELEPHONE (OUTPATIENT)
Dept: UROLOGY | Facility: CLINIC | Age: 53
End: 2023-05-12
Payer: COMMERCIAL

## 2023-05-12 DIAGNOSIS — N20.1 CALCULUS OF PROXIMAL RIGHT URETER: Primary | ICD-10-CM

## 2023-05-12 NOTE — TELEPHONE ENCOUNTER
[facility] Call Center    Phone Message    May a detailed message be left on voicemail: yes     Reason for Call: Other: .  Pt calling to schedule surgery with [facility]. Also pt states medication for kidney stone pain she received is not working. Please call pt     Action Taken: Message routed to:  Other: Uro    Travel Screening: Not Applicable

## 2023-05-12 NOTE — TELEPHONE ENCOUNTER
Spoke with patient, she's having 9.5/10 right flank pain.  Reports gross hematuria since 5/10/23 after discharging home from the ED.  Taking Oxycodone 5 mg q6h for pain along with rotating Tylenol 1000 mg and Ibuprofen 600 mg q6h which isn't helping much.  Patient is also taking Flomax 0.4 mg daily.  Patient denies any fever/chills, dysuria or urinary frequency/urgency.  Patient has surgery orders in for kidney stone removal.  Message routed to Dr. Treviño to further advise.  Message routed to surgery schedulers to schedule surgery.  Patient states understanding.    Deyanira Brown, RN, BSN  [facility]

## 2023-05-15 DIAGNOSIS — N20.1 CALCULUS OF PROXIMAL RIGHT URETER: Primary | ICD-10-CM

## 2023-05-15 RX ORDER — KETOROLAC TROMETHAMINE 10 MG/1
10 TABLET, FILM COATED ORAL EVERY 6 HOURS PRN
Qty: 20 TABLET | Refills: 0 | Status: CANCELLED | OUTPATIENT
Start: 2023-05-15

## 2023-05-15 RX ORDER — OXYCODONE HYDROCHLORIDE 5 MG/1
5 TABLET ORAL EVERY 6 HOURS PRN
Qty: 10 TABLET | Refills: 0 | Status: CANCELLED | OUTPATIENT
Start: 2023-05-15 | End: 2023-05-18

## 2023-05-15 RX ORDER — OXYCODONE HYDROCHLORIDE 5 MG/1
5 TABLET ORAL EVERY 6 HOURS PRN
Qty: 12 TABLET | Refills: 0 | Status: SHIPPED | OUTPATIENT
Start: 2023-05-15 | End: 2023-05-23

## 2023-05-15 RX ORDER — KETOROLAC TROMETHAMINE 10 MG/1
10 TABLET, FILM COATED ORAL EVERY 6 HOURS PRN
Qty: 20 TABLET | Refills: 0 | Status: SHIPPED | OUTPATIENT
Start: 2023-05-15 | End: 2023-05-19

## 2023-05-17 ENCOUNTER — TELEPHONE (OUTPATIENT)
Dept: UROLOGY | Facility: CLINIC | Age: 53
End: 2023-05-17

## 2023-05-17 ENCOUNTER — TELEPHONE (OUTPATIENT)
Dept: UROLOGY | Facility: CLINIC | Age: 53
End: 2023-05-17
Payer: COMMERCIAL

## 2023-05-17 NOTE — TELEPHONE ENCOUNTER
Spoke with: Patient       Date of surgery: Tuesday May 23 2023       Location: [facility]      Informed patient they will need a adult : YES      Pre op with provider: Patient will call to set up with PCP through [facility]      H&P Scheduled in PAC-NA        Pre procedure covid : NA      Additional imaging: NA        Surgery Packet : Patient given location infor over phone surgery nurse will jennifer patient with more details     Additional comments:Please call with surgery teaching

## 2023-05-19 ENCOUNTER — TELEPHONE (OUTPATIENT)
Dept: UROLOGY | Facility: CLINIC | Age: 53
End: 2023-05-19
Payer: COMMERCIAL

## 2023-05-19 DIAGNOSIS — R11.0 NAUSEA: Primary | ICD-10-CM

## 2023-05-19 RX ORDER — ONDANSETRON 4 MG/1
4 TABLET, ORALLY DISINTEGRATING ORAL EVERY 8 HOURS PRN
Qty: 20 TABLET | Refills: 0 | Status: SHIPPED | OUTPATIENT
Start: 2023-05-19 | End: 2023-08-09

## 2023-05-19 NOTE — TELEPHONE ENCOUNTER
Spoke with patient and completed surgery teaching.  Reviewed time, date and address of procedure.    Length of procedure and what to expect after, phone number given for any questions.  Went over no eating drinking, may have clear liquids 3 hours prior to procedure.  Shower in morning before procedure.  Patient will need a ride home and someone to stay overnight.  Patient has pre-op physical scheduled.  Lakshmi Alvarez RN

## 2023-05-22 ENCOUNTER — TELEPHONE (OUTPATIENT)
Dept: UROLOGY | Facility: CLINIC | Age: 53
End: 2023-05-22

## 2023-05-22 ENCOUNTER — ANESTHESIA EVENT (OUTPATIENT)
Dept: SURGERY | Facility: AMBULATORY SURGERY CENTER | Age: 53
End: 2023-05-22
Payer: COMMERCIAL

## 2023-05-22 ENCOUNTER — OFFICE VISIT (OUTPATIENT)
Dept: FAMILY MEDICINE | Facility: CLINIC | Age: 53
End: 2023-05-22
Payer: COMMERCIAL

## 2023-05-22 VITALS
BODY MASS INDEX: 30.36 KG/M2 | HEART RATE: 77 BPM | DIASTOLIC BLOOD PRESSURE: 86 MMHG | SYSTOLIC BLOOD PRESSURE: 134 MMHG | TEMPERATURE: 97.6 F | HEIGHT: 62 IN | OXYGEN SATURATION: 97 % | WEIGHT: 165 LBS | RESPIRATION RATE: 16 BRPM

## 2023-05-22 DIAGNOSIS — Z01.818 PREOP GENERAL PHYSICAL EXAM: Primary | ICD-10-CM

## 2023-05-22 DIAGNOSIS — E27.8 ADRENAL MASS (H): ICD-10-CM

## 2023-05-22 DIAGNOSIS — N20.0 CALCULUS OF KIDNEY: ICD-10-CM

## 2023-05-22 PROCEDURE — 99214 OFFICE O/P EST MOD 30 MIN: CPT | Performed by: NURSE PRACTITIONER

## 2023-05-22 NOTE — TELEPHONE ENCOUNTER
[facility] Call Center    Phone Message    May a detailed message be left on voicemail: yes     Reason for Call: Ramya Guardado APRN CNP, called stating the patient was in for pre-op physical for her procedure tomorrow. The patient is reporting no symptoms at this time, and they are wondering if the patient should have imaging done prior? Writer not able to connect to back line. Please contact Ramya on her cell at [phone]. Thank you.    Action Taken: Message routed to:  Other: Urology    Travel Screening: Not Applicable

## 2023-05-22 NOTE — PROGRESS NOTES
[facility]  [id number]  [facility] [address]  Phone: [phone]  Primary Provider: No Ref-Primary, Physician  Pre-op Performing Provider: WAQAR TESFAYE      PREOPERATIVE EVALUATION:  Today's date: 5/22/2023    Mindy Guardado is a 52 year old female who presents for a preoperative evaluation.      5/22/2023    12:37 PM   Additional Questions   Roomed by Janet GALVEZ     Surgical Information:  Surgery/Procedure:  CYSTOURETEROSCOPY, WITH Retrograde Pyelogram Laser Lithotripsy CALCULUS REMOVAL AND URETERAL STENT INSERTION  Surgery Location: [facility]   Surgeon: Dr. Treviño    Surgery Date: 5/23/23  Time of Surgery: 0945  Where patient plans to recover: At home with family  Fax number for surgical facility: Note does not need to be faxed, will be available electronically in Epic.    Assessment & Plan     The proposed surgical procedure is considered INTERMEDIATE risk.    Preop general physical exam      Calculus of kidney  - currently asymptomatic     Adrenal mass (H)    - MR Adrenal wo and w Contrast  - Adult Endocrinology  Referral      Antiplatelet or Anticoagulation Medication Instructions:   - Patient is on no antiplatelet or anticoagulation medications.    Additional Medication Instructions:  Patient is on no additional chronic medications    RECOMMENDATION:  APPROVAL GIVEN to proceed with proposed procedure, without further diagnostic evaluation.            Subjective       HPI related to upcoming procedure: Calculus of proximal right ureter         5/22/2023    12:26 PM   Preop Questions   1. Have you ever had a heart attack or stroke? No   2. Have you ever had surgery on your heart or blood vessels, such as a stent placement, a coronary artery bypass, or surgery on an artery in your head, neck, heart, or legs? No   3. Do you have chest pain with activity? No   4. Do you have a history of  heart failure? No   5. Do you currently have a cold, bronchitis or  symptoms of other infection? No   6. Do you have a cough, shortness of breath, or wheezing? No   7. Do you or anyone in your family have previous history of blood clots? No   8. Do you or does anyone in your family have a serious bleeding problem such as prolonged bleeding following surgeries or cuts? No   9. Have you ever had problems with anemia or been told to take iron pills? No   10. Have you had any abnormal blood loss such as black, tarry or bloody stools, or abnormal vaginal bleeding? No   11. Have you ever had a blood transfusion? No   12. Are you willing to have a blood transfusion if it is medically needed before, during, or after your surgery? NO -    13. Have you or any of your relatives ever had problems with anesthesia? No   14. Do you have sleep apnea, excessive snoring or daytime drowsiness? No   15. Do you have any artifical heart valves or other implanted medical devices like a pacemaker, defibrillator, or continuous glucose monitor? No   16. Do you have artificial joints? No   17. Are you allergic to latex? No   18. Is there any chance that you may be pregnant? No       Health Care Directive:  Patient does not have a Health Care Directive or Living Will: Discussed advance care planning with patient; however, patient declined at this time.    Preoperative Review of :   reviewed - controlled substances reflected in medication list.      Status of Chronic Conditions:  See problem list for active medical problems.  Problems all longstanding and stable, except as noted/documented.  See ROS for pertinent symptoms related to these conditions.      Review of Systems  CONSTITUTIONAL: NEGATIVE for fever, chills, change in weight  INTEGUMENTARY/SKIN: NEGATIVE for worrisome rashes, moles or lesions  EYES: NEGATIVE for vision changes or irritation  ENT/MOUTH: NEGATIVE for ear, mouth and throat problems  RESP: NEGATIVE for significant cough or SOB  CV: NEGATIVE for chest pain, palpitations or peripheral  edema  GI: NEGATIVE for nausea, abdominal pain, heartburn, or change in bowel habits  : NEGATIVE for frequency, dysuria, or hematuria  MUSCULOSKELETAL: NEGATIVE for significant arthralgias or myalgia  NEURO: NEGATIVE for weakness, dizziness or paresthesias  ENDOCRINE: NEGATIVE for temperature intolerance, skin/hair changes  HEME: NEGATIVE for bleeding problems  PSYCHIATRIC: NEGATIVE for changes in mood or affect    Patient Active Problem List    Diagnosis Date Noted     Calculus of proximal right ureter 05/11/2023     Priority: Medium     Added automatically from request for surgery [id number]        Past Medical History:   Diagnosis Date     PONV (postoperative nausea and vomiting)      Renal disease      No past surgical history on file.  Current Outpatient Medications   Medication Sig Dispense Refill     acetaminophen (TYLENOL) 500 MG tablet Take 2 tablets (1,000 mg) by mouth every 6 hours as needed for pain 28 tablet 0     dimenhyDRINATE (DRAMAMINE) 50 MG CHEW Take 1 tablet (50 mg) by mouth every 8 hours as needed for other (kidney stone pain) 21 tablet 0     ibuprofen (ADVIL/MOTRIN) 600 MG tablet Take 1 tablet (600 mg) by mouth every 6 hours as needed for moderate pain 28 tablet 0     ondansetron (ZOFRAN ODT) 4 MG ODT tab Take 1 tablet (4 mg) by mouth every 8 hours as needed for nausea 20 tablet 0     oxyCODONE (ROXICODONE) 5 MG tablet Take 1 tablet (5 mg) by mouth every 6 hours as needed for pain or moderate to severe pain 12 tablet 0       Allergies   Allergen Reactions     Penicillins      Shellfish-Derived Products Hives     Azithromycin Rash     Possible allergy. Urticaria, rash 2 days after finishing Z-lexa treated at  ED  Possible allergy. Urticaria, rash 2 days after finishing Z-lexa treated at  ED       Metronidazole Rash     Flagyl  Flagyl       Oxycodone-Acetaminophen Rash and Nausea and Vomiting     Other reaction(s): Vomiting          Social History     Tobacco Use     Smoking status: Every Day     " Packs/day: 0.30     Types: Cigarettes     Smokeless tobacco: Never   Vaping Use     Vaping status: Never Used   Substance Use Topics     Alcohol use: Not on file       History   Drug Use Unknown         Objective     /86   Pulse 77   Temp 97.6  F (36.4  C) (Tympanic)   Resp 16   Ht 1.575 m (5' 2\")   Wt 74.8 kg (165 lb)   SpO2 97%   BMI 30.18 kg/m      Physical Exam    GENERAL APPEARANCE: healthy, alert and no distress     EYES: Eyes grossly normal to inspection and conjunctivae and sclerae normal     HENT: oral mucous membranes moist and oropharynx clear     NECK: no adenopathy, no asymmetry, masses, or scars and thyroid normal to palpation     RESP: lungs clear to auscultation - no rales, rhonchi or wheezes     CV: regular rates and rhythm, normal S1 S2, no S3 or S4 and no murmur, click or rub     ABDOMEN: soft, non-tender, no distention      MS: extremities normal- no gross deformities noted, no evidence of inflammation in joints, FROM in all extremities.     SKIN: no suspicious lesions or rashes     NEURO: Normal strength and tone, sensory exam grossly normal, mentation intact and speech normal     PSYCH: mentation appears normal. and affect normal/bright     LYMPHATICS: No cervical adenopathy    Recent Labs   Lab Test 05/10/23  1206   HGB 15.9*         POTASSIUM 4.5   CR 1.03*        Diagnostics:  No labs were ordered during this visit.   No EKG required, no history of coronary heart disease, significant arrhythmia, peripheral arterial disease or other structural heart disease.    Revised Cardiac Risk Index (RCRI):  The patient has the following serious cardiovascular risks for perioperative complications:   - No serious cardiac risks = 0 points     RCRI Interpretation: 0 points: Class I (very low risk - 0.4% complication rate)           Signed Electronically by: ALBERTINA Ontiveros CNP  Copy of this evaluation report is provided to requesting physician.      "

## 2023-05-22 NOTE — PATIENT INSTRUCTIONS
For informational purposes only. Not to replace the advice of your health care provider. Copyright   2003,  [employer] [facility]. All rights reserved. Clinically reviewed by Yasmine Dan MD. [employer] [id number] - REV .  Preparing for Your Surgery  Getting started  A nurse will call you to review your health history and instructions. They will give you an arrival time based on your scheduled surgery time. Please be ready to share:    Your doctor's clinic name and phone number    Your medical, surgical, and anesthesia history    A list of allergies and sensitivities    A list of medicines, including herbal treatments and over-the-counter drugs    Whether the patient has a legal guardian (ask how to send us the papers in advance)  Please tell us if you're pregnant--or if there's any chance you might be pregnant. Some surgeries may injure a fetus (unborn baby), so they require a pregnancy test. Surgeries that are safe for a fetus don't always need a test, and you can choose whether to have one.   If you have a child who's having surgery, please ask for a copy of Preparing for Your Child's Surgery.    Preparing for surgery    Within 10 to 30 days of surgery: Have a pre-op exam (sometimes called an H&P, or History and Physical). This can be done at a clinic or pre-operative center.  ? If you're having a , you may not need this exam. Talk to your care team.    At your pre-op exam, talk to your care team about all medicines you take. If you need to stop any medicines before surgery, ask when to start taking them again.  ? We do this for your safety. Many medicines can make you bleed too much during surgery. Some change how well surgery (anesthesia) drugs work.    Call your insurance company to let them know you're having surgery. (If you don't have insurance, call [phone].)    Call your clinic if there's any change in your health. This includes signs of a cold or flu (sore throat, runny nose,  "DIAGNOSIS:  Alaryngeal voice (R49.0), s/p laryngectomy (Z90.02), History laryngeal cancer (Z85.21), History head and neck radiation (Z92.3) and History chemotherapy (Z92.21)  REFERRING DOCTOR:  Jesse James M.D., Head and Neck Surgical Oncologist     REASON FOR VISIT:   2/17/22: Mr Batres returns reporting resolution of blood in stoma. He is on puree diet without any reported swallow difficulty. He has been using electrolarynx with family and friends, his wife understands him fairly well. Overall, he feels he is improving including gaining weight.     1/28/22: Nicolás Batres returns today reporting blood in stoma, filling larytube at times. This occurred on 1 day, he was concerned he became too dry following taking Benadryl. He continues to be NPO. He has been practicing with electrolarynx, wife states she understands "maybe half of what he says." They are both somewhat frustrated with communication with EL. He feels that neck is tight, thinks it is related to staples. He reports continued weight loss or at least no weight gain. He is hungry, despite feeding tube use of 5 cans per day. He is meeting with dietician today.      1/20/22: Cyrus Batres Jr. returned to clinic today for electrolarynx train, obtaining supplies, stoma care.  He was un/accompanied by his wife. He states he has gained a few lbs since hospital discharge. He is currently NPO, relying on feeding tube for nutrition. He describes gurgling sound after tube feeds, can taste the formula. He is using 4-5 cans a day. "Doesn't feel full or hungry." 1 carton at a time with water, upright endorses reflux even if he stays upright for an hour. "Food water and medicines go down initially and then come back up" kind of regurgitate into the tube and it won't clear. He and wife are managing stoma well, he is wearing Larytube and HMEs.     Oncology History   Larynx neoplasm malignant   8/4/2020 Initial Diagnosis    Larynx neoplasm malignant     8/6/2020 Tumor " Conference    His case was discussed at the Multidisciplinary Head and Neck Team Planning Meeting.    Representatives from Medical Oncology, Radiation Oncology, Head and Neck Surgical Oncology, Psychosocial Oncology, and Speech and Language Pathology discussed the case with the following recommendations:    1) definitive radiation              8/6/2020 Cancer Staged    Staging form: Larynx - Glottis, AJCC 8th Edition  - Clinical stage from 8/6/2020: Stage II (cT2, cN0, cM0)     8/6/2020 Cancer Staged    Cancer Staging  Larynx neoplasm malignant  Staging form: Larynx - Glottis, AJCC 8th Edition  - Clinical stage from 8/6/2020: Stage II (cT2, cN0, cM0) - Signed by Fariha Muñoz NP on 8/6/2020 12/16/2021 Tumor Conference    His case was discussed at the Multidisciplinary Head and Neck Team Planning Meeting.    Representatives from Medical Oncology, Radiation Oncology, Head and Neck Surgical Oncology, Psychosocial Oncology, and Speech and Language Pathology discussed the case with the following recommendations:    1) TL  2) oncology referral  3) psychology referral            12/27/2021 Surgery    1. DL And tracheostomy  2. PEG     1/6/2022 Surgery    1. Diagnostic direct laryngoscopy  2. Bilateral modified neck dissection of levels 2 through 4  3. Total laryngectomy  4. Total thyroidectomy with bilateral paratracheal/upper mediastinal neck dissection  5. Autotransplantation of left inferior parathyroid into left sternocleidomastoid muscle   6. Left anterolateral thigh free flap for closure of total laryngectomy neck skin defect  7. Advancement flap for closure of left thigh donor site advanced area was 25 x 10 cm     1/20/2022 Cancer Staged    Staging form: Larynx - Glottis, AJCC 8th Edition  - Pathologic stage from 1/20/2022: Stage LOU (pT4a, pN0, cM0)          Past Medical History:   Diagnosis Date    Allergy     pollen extracts    Atrial fibrillation     Chronic anticoagulation     Diabetes mellitus,  cough, rash, fever). It also includes a scrape or scratch near the surgery site.    If you have questions on the day of surgery, call your hospital or surgery center.  Eating and drinking guidelines  For your safety: Unless your surgeon tells you otherwise, follow the guidelines below.    Eat and drink as usual until 8 hours before you arrive for surgery. After that, no food or milk.    Drink clear liquids until 2 hours before you arrive. These are liquids you can see through, like water, Gatorade, and Propel Water. They also include plain black coffee and tea (no cream or milk), candy, and breath mints. You can spit out gum when you arrive.    If you drink alcohol: Stop drinking it the night before surgery.    If your care team tells you to take medicine on the morning of surgery, it's okay to take it with a sip of water.  Preventing infection    Shower or bathe the night before and morning of your surgery. Follow the instructions your clinic gave you. (If no instructions, use regular soap.)    Don't shave or clip hair near your surgery site. We'll remove the hair if needed.    Don't smoke or vape the morning of surgery. You may chew nicotine gum up to 2 hours before surgery. A nicotine patch is okay.  ? Note: Some surgeries require you to completely quit smoking and nicotine. Check with your surgeon.    Your care team will make every effort to keep you safe from infection. We will:  ? Clean our hands often with soap and water (or an alcohol-based hand rub).  ? Clean the skin at your surgery site with a special soap that kills germs.  ? Give you a special gown to keep you warm. (Cold raises the risk of infection.)  ? Wear special hair covers, masks, gowns and gloves during surgery.  ? Give antibiotic medicine, if prescribed. Not all surgeries need antibiotics.  What to bring on the day of surgery    Photo ID and insurance card    Copy of your health care directive, if you have one    Glasses and hearing aids (bring  cases)  ? You can't wear contacts during surgery    Inhaler and eye drops, if you use them (tell us about these when you arrive)    CPAP machine or breathing device, if you use them    A few personal items, if spending the night    If you have . . .  ? A pacemaker, ICD (cardiac defibrillator) or other implant: Bring the ID card.  ? An implanted stimulator: Bring the remote control.  ? A legal guardian: Bring a copy of the certified (court-stamped) guardianship papers.  Please remove any jewelry, including body piercings. Leave jewelry and other valuables at home.  If you're going home the day of surgery    You must have a responsible adult drive you home. They should stay with you overnight as well.    If you don't have someone to stay with you, and you aren't safe to go home alone, we may keep you overnight. Insurance often won't pay for this.  After surgery  If it's hard to control your pain or you need more pain medicine, please call your surgeon's office.  Questions?   If you have any questions for your care team, list them here: _________________________________________________________________________________________________________________________________________________________________________ ____________________________________ ____________________________________ ____________________________________     type 2     Hypertension     Larynx neoplasm malignant 8/4/2020        Current Outpatient Medications on File Prior to Visit   Medication Sig Dispense Refill    apixaban (ELIQUIS) 5 mg Tab Take 1 tablet (5 mg total) by mouth 2 (two) times daily. 180 tablet 2    aspirin (ECOTRIN) 81 MG EC tablet Take 1 tablet by mouth Daily.      blood sugar diagnostic (BLOOD GLUCOSE TEST) Strp 1 strip by Misc.(Non-Drug; Combo Route) route 2 (two) times daily before meals. 200 strip 2    calcitrioL (ROCALTROL) 1 mcg/mL solution Take 0.5 mLs (0.5 mcg total) by mouth once daily. 15 mL 0    calcium carbonate 500 mg/5 mL (1,250 mg/5 mL) Take 10 mLs (1,000 mg total) by G Tube route 3 (three) times daily. Take 1000 mg (10 mLs) of calcium 3 times daily for 1 week, then 1000 mg (10 mLs) twice daily x 1 week, 10 mLs once daily x 1 week, then stop. 473 mL 0    ciclopirox (LOPROX) 0.77 % Crea Apply topically 2 (two) times daily. 1 Tube 2    diltiaZEM (CARDIZEM) 30 MG tablet take 1 tablet (30 mg total) by Per G Tube route every 6 (six) hours. 120 tablet 2    docusate (COLACE) 50 mg/5 mL liquid take 10 mLs (100 mg total) by Per G Tube route 2 (two) times daily. 473 mL 1    ergocalciferol (ERGOCALCIFEROL) 50,000 unit Cap TAKE 1 CAPSULE TWICE WEEKLY 24 capsule 3    famotidine (PEPCID) 20 MG tablet take 1 tablet (20 mg total) by Per G Tube route 2 (two) times daily. 60 tablet 11    ipratropium (ATROVENT) 21 mcg (0.03 %) nasal spray 2 sprays by Nasal route 2 (two) times daily. 30 mL 11    levothyroxine (SYNTHROID) 112 MCG tablet take 1 tablet (112 mcg total) by Per G Tube route before breakfast. Take 4 hours before first dose of calcium and 3 hours before tube feed 30 tablet 11    levothyroxine (SYNTHROID) 112 MCG tablet Take 1 tablet (112 mcg total) by mouth before breakfast. 30 tablet 11    losartan (COZAAR) 100 MG tablet TAKE 1 TABLET BY MOUTH EVERY DAY (Patient taking differently: Take 100 mg by mouth every evening.) 90 tablet 3  "   oxyCODONE (ROXICODONE) 5 mg/5 mL Soln take 5 mLs (5 mg total) by Per G Tube route every 6 (six) hours as needed (pain). 473 mL 0    pen needle, diabetic (BD ULTRA-FINE YULISSA PEN NEEDLE) 32 gauge x 5/32" Ndle Use once weekly. 30 each 1    polyethylene glycol (GLYCOLAX) 17 gram/dose powder Dissolve 1 capful (17 grams) in liquid and give by Per G Tube route once daily. 510 g 0    sennosides 8.8 mg/5 ml (SENOKOT) 8.8 mg/5 mL syrup 5 mLs by Per G Tube route daily as needed (constipation). 150 mL 0     Current Facility-Administered Medications on File Prior to Visit   Medication Dose Route Frequency Provider Last Rate Last Admin    lactated ringers infusion   Intravenous Continuous Torsten Hilton MD            TREATMENT HISTORY:  Hx of radiation tx, 1/6/22 salvage laryngectomy.     INTERVAL HISTORY:  His salvage TL was 1/6/22.       INTERVENTION:  Stoma: Stoma is patent with resolution of dried blood noted at prior session. Minimal mucus was suctioned. He has been  Using saline in stoma effectively, is much less dry. Larytube is small inside stoma, size 9, he has not yet received 12 LaryTube from [facility]. He was fit with stoma adhesive and HME, he demonstrated good understanding of placement and use of adhesive vs LaryTube.     Pulmonary rehab:  Wearing Larytube with HME, now using water soluble lubricant for tube insertion     AL training:  Electrolarynx pitch appears adequate. He is demonstrating improved independence with oral placement of electrolarynx. He is at least 75% intelligible with known context.      Swallowing:  Currently puree diet, now able to advance to soft diet     Supplies: This clinician will again check on prescription sent to [facility] for HMEs, LaryTube and Trutone EL.       GERD management:  Encouraged remaining upright for at least 2 hours following meals.         At the end of the session, Cyrus Batres Jr. was wearing  1. Stoma adhesive and HMEs        IMPRESSIONS:  1.  Mr Batres is s/p 1/6/22 " salvage laryngectomy  2.  History of  Past Medical History:   Diagnosis Date    Allergy     pollen extracts    Atrial fibrillation     Chronic anticoagulation     Diabetes mellitus, type 2     Hypertension     Larynx neoplasm malignant 8/4/2020           Past Surgical History:   Procedure Laterality Date    DIRECT LARYNGOBRONCHOSCOPY N/A 12/27/2021    Procedure: LARYNGOSCOPY, DIRECT, WITH BRONCHOSCOPY;  Surgeon: Flex Espinosa MD;  [facility];  Service: ENT;  Laterality: N/A;    DISSECTION OF NECK Bilateral 1/6/2022    Procedure: DISSECTION, NECK;  Surgeon: Jesse James MD;  [facility];  Service: ENT;  Laterality: Bilateral;    FLAP PROCEDURE Right 1/6/2022    Procedure: CREATION, FREE FLAP;  Surgeon: Alise Hart MD;  [facility];  Service: ENT;  Laterality: Right;  Ischemic start 1351  Ischemic stop 1502    LARYNGECTOMY N/A 1/6/2022    Procedure: LARYNGECTOMY;  Surgeon: Jesse James MD;  [facility];  Service: ENT;  Laterality: N/A;    LARYNGOSCOPY N/A 8/4/2020    Procedure: Suspension microlaryngoscopy with biopsy, possible KTP laser treatment/excision;  Surgeon: Stew Noel MD;  Location: [facility] OR [address] [place] [address];  Service: ENT;  Laterality: N/A;  Microscope, telescopes, tower, microinstruments, KTP laser, rep conf# [id number] IC 7/28.    LARYNGOSCOPY N/A 3/16/2021    Procedure: Suspension microlaryngoscopy with excision of lesion, possible CO2 laser;  Surgeon: Stew Noel MD;  Location: [address];  Service: ENT;  Laterality: N/A;  Microscope, telescopes, tower, microinstruments, CO2 laser, rep conf# [id number] IC 3/4.    LARYNGOSCOPY N/A 4/1/2021    Procedure: Suspension microlaryngoscopy with KTP laser excision of lesion;  Surgeon: Stew Noel MD;  [facility];  Service: ENT;  Laterality: N/A;  Microscope, telescopes, tower, microinstruments, 70 degree scope, vocal fold , KTP laser,  rep conf# [id number] BC    LARYNGOSCOPY N/A 12/9/2021    Procedure: Suspension microlaryngoscopy with biopsy;  Surgeon: Stew Noel MD;  [facility]: [facility];  Service: ENT;  Laterality: N/A;  Microscope, telescopes, tower, microinstruments    LARYNGOSCOPY N/A 1/6/2022    Procedure: LARYNGOSCOPY;  Surgeon: Jesse James MD;  Location: [facility];  Service: ENT;  Laterality: N/A;    MICROLARYNGOSCOPY N/A 3/17/2020    Procedure: MICROLARYNGOSCOPY;  Surgeon: Jung Xiao MD;  Location: [facility] OR;  Service: ENT;  Laterality: N/A;  Laser Microlaryngoscopy  NEED TO SCHEDULE LASER from [employer] [id number]    REIMPLANTATION OF PARATHYROID TISSUE N/A 1/6/2022    Procedure: REIMPLANTATION, PARATHYROID TISSUE;  Surgeon: Jesse James MD;  Location: [facility] OR [facility];  Service: ENT;  Laterality: N/A;    THYROIDECTOMY  1/6/2022    Procedure: THYROIDECTOMY;  Surgeon: Jesse James MD;  [facility];  Service: ENT;;    TRACHEOSTOMY N/A 12/27/2021    Procedure: CREATION, TRACHEOSTOMY;  Surgeon: Flex Espinosa MD;  [facility];  Service: ENT;  Laterality: N/A;              RECOMMENDATIONS/PLAN OF CARE:    1.  Continued use of electrolarynx with oral placement for all oral communication.  2.  Daily cleaning of stoma and Larytube, at least twice daily - or more, depending on mucus production.  3.  Continue use of HMEs and LaryTube or stoma adhesive 24/7 daily. Continue use of saline mist in stoma several times per day.  4. Continued ST for EL training and stoma care.  5. F/u for insufflation testing  6. Follow up with  or Fariha Muñoz NP as directed.     Long-term goals:  Cyrus Batres Jr.  will   1.  Be independent in use of electrolarynx for all oral communication.  2.  Be independent in care of stoma.        Short-term objectives:  Cyrus Batres Jr. will  1.  Continued use of electrolarynx for all oral communication.  2.  Twice daily cleaning of  stoma.  3.  Continue use of HMEs and LaryTube or adhesives 24/7 daily.  4.  Continued ST for 12 weeks with a home program.              A.  AL training  5. F/u in 1 month for insufflation, or sooner should he have any difficulties with stoma care or swallow  6. Advance diet to soft foods               Bactrim Counseling:  I discussed with the patient the risks of sulfa antibiotics including but not limited to GI upset, allergic reaction, drug rash, diarrhea, dizziness, photosensitivity, and yeast infections.  Rarely, more serious reactions can occur including but not limited to aplastic anemia, agranulocytosis, methemoglobinemia, blood dyscrasias, liver or kidney failure, lung infiltrates or desquamative/blistering drug rashes.

## 2023-05-22 NOTE — TELEPHONE ENCOUNTER
Spoke with Ramya, patient has not passed her stone, has seen some particles in urine.  Stone was proximal at last imaging and no stones have passed.  Patient will continue with surgical procedure as planned.  Lakshmi Alvarez RN

## 2023-05-23 ENCOUNTER — HOSPITAL ENCOUNTER (OUTPATIENT)
Facility: AMBULATORY SURGERY CENTER | Age: 53
Discharge: HOME | End: 2023-05-23
Attending: STUDENT IN AN ORGANIZED HEALTH CARE EDUCATION/TRAINING PROGRAM
Payer: COMMERCIAL

## 2023-05-23 ENCOUNTER — ANESTHESIA (OUTPATIENT)
Dept: SURGERY | Facility: AMBULATORY SURGERY CENTER | Age: 53
End: 2023-05-23
Payer: COMMERCIAL

## 2023-05-23 VITALS
TEMPERATURE: 97.2 F | DIASTOLIC BLOOD PRESSURE: 70 MMHG | BODY MASS INDEX: 30.36 KG/M2 | HEIGHT: 62 IN | SYSTOLIC BLOOD PRESSURE: 170 MMHG | WEIGHT: 165 LBS | OXYGEN SATURATION: 98 % | RESPIRATION RATE: 16 BRPM | HEART RATE: 61 BPM

## 2023-05-23 DIAGNOSIS — N20.1 CALCULUS OF PROXIMAL RIGHT URETER: Primary | ICD-10-CM

## 2023-05-23 DIAGNOSIS — N20.1 CALCULUS OF PROXIMAL RIGHT URETER: ICD-10-CM

## 2023-05-23 PROCEDURE — 99000 SPECIMEN HANDLING OFFICE-LAB: CPT | Performed by: STUDENT IN AN ORGANIZED HEALTH CARE EDUCATION/TRAINING PROGRAM

## 2023-05-23 PROCEDURE — 82365 CALCULUS SPECTROSCOPY: CPT | Performed by: STUDENT IN AN ORGANIZED HEALTH CARE EDUCATION/TRAINING PROGRAM

## 2023-05-23 PROCEDURE — 52356 CYSTO/URETERO W/LITHOTRIPSY: CPT | Mod: RT | Performed by: STUDENT IN AN ORGANIZED HEALTH CARE EDUCATION/TRAINING PROGRAM

## 2023-05-23 PROCEDURE — 74420 UROGRAPHY RTRGR +-KUB: CPT | Mod: 26 | Performed by: STUDENT IN AN ORGANIZED HEALTH CARE EDUCATION/TRAINING PROGRAM

## 2023-05-23 DEVICE — URETERAL STENT
Type: IMPLANTABLE DEVICE | Site: URETER | Status: FUNCTIONAL
Brand: PERCUFLEX™ PLUS

## 2023-05-23 RX ORDER — SODIUM CHLORIDE, SODIUM LACTATE, POTASSIUM CHLORIDE, CALCIUM CHLORIDE 600; 310; 30; 20 MG/100ML; MG/100ML; MG/100ML; MG/100ML
INJECTION, SOLUTION INTRAVENOUS CONTINUOUS
Status: DISCONTINUED | OUTPATIENT
Start: 2023-05-23 | End: 2023-05-24 | Stop reason: HOSPADM

## 2023-05-23 RX ORDER — ONDANSETRON 2 MG/ML
INJECTION INTRAMUSCULAR; INTRAVENOUS PRN
Status: DISCONTINUED | OUTPATIENT
Start: 2023-05-23 | End: 2023-05-23

## 2023-05-23 RX ORDER — CEFAZOLIN SODIUM 2 G/100ML
2 INJECTION, SOLUTION INTRAVENOUS SEE ADMIN INSTRUCTIONS
Status: DISCONTINUED | OUTPATIENT
Start: 2023-05-23 | End: 2023-05-24 | Stop reason: HOSPADM

## 2023-05-23 RX ORDER — ACETAMINOPHEN 325 MG/1
975 TABLET ORAL ONCE
Status: COMPLETED | OUTPATIENT
Start: 2023-05-23 | End: 2023-05-23

## 2023-05-23 RX ORDER — TAMSULOSIN HYDROCHLORIDE 0.4 MG/1
0.4 CAPSULE ORAL DAILY
Qty: 7 CAPSULE | Refills: 0 | Status: SHIPPED | OUTPATIENT
Start: 2023-05-23 | End: 2023-05-30

## 2023-05-23 RX ORDER — HYDROMORPHONE HCL IN WATER/PF 6 MG/30 ML
0.4 PATIENT CONTROLLED ANALGESIA SYRINGE INTRAVENOUS EVERY 5 MIN PRN
Status: DISCONTINUED | OUTPATIENT
Start: 2023-05-23 | End: 2023-05-24 | Stop reason: HOSPADM

## 2023-05-23 RX ORDER — FENTANYL CITRATE 0.05 MG/ML
25 INJECTION, SOLUTION INTRAMUSCULAR; INTRAVENOUS
Status: DISCONTINUED | OUTPATIENT
Start: 2023-05-23 | End: 2023-05-24 | Stop reason: HOSPADM

## 2023-05-23 RX ORDER — ONDANSETRON 2 MG/ML
4 INJECTION INTRAMUSCULAR; INTRAVENOUS EVERY 30 MIN PRN
Status: DISCONTINUED | OUTPATIENT
Start: 2023-05-23 | End: 2023-05-24 | Stop reason: HOSPADM

## 2023-05-23 RX ORDER — OXYCODONE HYDROCHLORIDE 5 MG/1
5-10 TABLET ORAL EVERY 4 HOURS PRN
Qty: 4 TABLET | Refills: 0 | Status: SHIPPED | OUTPATIENT
Start: 2023-05-23 | End: 2023-08-09

## 2023-05-23 RX ORDER — FENTANYL CITRATE 50 UG/ML
INJECTION, SOLUTION INTRAMUSCULAR; INTRAVENOUS PRN
Status: DISCONTINUED | OUTPATIENT
Start: 2023-05-23 | End: 2023-05-23

## 2023-05-23 RX ORDER — DEXAMETHASONE SODIUM PHOSPHATE 4 MG/ML
INJECTION, SOLUTION INTRA-ARTICULAR; INTRALESIONAL; INTRAMUSCULAR; INTRAVENOUS; SOFT TISSUE PRN
Status: DISCONTINUED | OUTPATIENT
Start: 2023-05-23 | End: 2023-05-23

## 2023-05-23 RX ORDER — LIDOCAINE 40 MG/G
CREAM TOPICAL
Status: DISCONTINUED | OUTPATIENT
Start: 2023-05-23 | End: 2023-05-24 | Stop reason: HOSPADM

## 2023-05-23 RX ORDER — ONDANSETRON 4 MG/1
4 TABLET, ORALLY DISINTEGRATING ORAL EVERY 8 HOURS PRN
Qty: 4 TABLET | Refills: 0 | Status: SHIPPED | OUTPATIENT
Start: 2023-05-23 | End: 2023-08-09

## 2023-05-23 RX ORDER — HYDROMORPHONE HCL IN WATER/PF 6 MG/30 ML
0.2 PATIENT CONTROLLED ANALGESIA SYRINGE INTRAVENOUS EVERY 5 MIN PRN
Status: DISCONTINUED | OUTPATIENT
Start: 2023-05-23 | End: 2023-05-24 | Stop reason: HOSPADM

## 2023-05-23 RX ORDER — AMOXICILLIN 250 MG
1-2 CAPSULE ORAL 2 TIMES DAILY
Qty: 30 TABLET | Refills: 0 | Status: SHIPPED | OUTPATIENT
Start: 2023-05-23 | End: 2023-08-09

## 2023-05-23 RX ORDER — FENTANYL CITRATE 0.05 MG/ML
25 INJECTION, SOLUTION INTRAMUSCULAR; INTRAVENOUS EVERY 5 MIN PRN
Status: DISCONTINUED | OUTPATIENT
Start: 2023-05-23 | End: 2023-05-24 | Stop reason: HOSPADM

## 2023-05-23 RX ORDER — PROPOFOL 10 MG/ML
INJECTION, EMULSION INTRAVENOUS CONTINUOUS PRN
Status: DISCONTINUED | OUTPATIENT
Start: 2023-05-23 | End: 2023-05-23

## 2023-05-23 RX ORDER — OXYBUTYNIN CHLORIDE 5 MG/1
5 TABLET, EXTENDED RELEASE ORAL DAILY
Qty: 7 TABLET | Refills: 0 | Status: SHIPPED | OUTPATIENT
Start: 2023-05-23 | End: 2023-05-30

## 2023-05-23 RX ORDER — ONDANSETRON 4 MG/1
4 TABLET, ORALLY DISINTEGRATING ORAL EVERY 30 MIN PRN
Status: DISCONTINUED | OUTPATIENT
Start: 2023-05-23 | End: 2023-05-24 | Stop reason: HOSPADM

## 2023-05-23 RX ORDER — PROPOFOL 10 MG/ML
INJECTION, EMULSION INTRAVENOUS PRN
Status: DISCONTINUED | OUTPATIENT
Start: 2023-05-23 | End: 2023-05-23

## 2023-05-23 RX ORDER — FENTANYL CITRATE 0.05 MG/ML
50 INJECTION, SOLUTION INTRAMUSCULAR; INTRAVENOUS EVERY 5 MIN PRN
Status: DISCONTINUED | OUTPATIENT
Start: 2023-05-23 | End: 2023-05-24 | Stop reason: HOSPADM

## 2023-05-23 RX ORDER — LIDOCAINE HYDROCHLORIDE 20 MG/ML
INJECTION, SOLUTION INFILTRATION; PERINEURAL PRN
Status: DISCONTINUED | OUTPATIENT
Start: 2023-05-23 | End: 2023-05-23

## 2023-05-23 RX ORDER — CEFAZOLIN SODIUM 2 G/100ML
2 INJECTION, SOLUTION INTRAVENOUS
Status: COMPLETED | OUTPATIENT
Start: 2023-05-23 | End: 2023-05-23

## 2023-05-23 RX ADMIN — ACETAMINOPHEN 975 MG: 325 TABLET ORAL at 08:48

## 2023-05-23 RX ADMIN — CEFAZOLIN SODIUM 2 G: 2 INJECTION, SOLUTION INTRAVENOUS at 09:41

## 2023-05-23 RX ADMIN — DEXAMETHASONE SODIUM PHOSPHATE 8 MG: 4 INJECTION, SOLUTION INTRA-ARTICULAR; INTRALESIONAL; INTRAMUSCULAR; INTRAVENOUS; SOFT TISSUE at 09:45

## 2023-05-23 RX ADMIN — SODIUM CHLORIDE, SODIUM LACTATE, POTASSIUM CHLORIDE, CALCIUM CHLORIDE: 600; 310; 30; 20 INJECTION, SOLUTION INTRAVENOUS at 09:41

## 2023-05-23 RX ADMIN — LIDOCAINE HYDROCHLORIDE 40 MG: 20 INJECTION, SOLUTION INFILTRATION; PERINEURAL at 09:45

## 2023-05-23 RX ADMIN — ONDANSETRON 4 MG: 2 INJECTION INTRAMUSCULAR; INTRAVENOUS at 10:14

## 2023-05-23 RX ADMIN — PROPOFOL 160 MCG/KG/MIN: 10 INJECTION, EMULSION INTRAVENOUS at 09:45

## 2023-05-23 RX ADMIN — FENTANYL CITRATE 100 MCG: 50 INJECTION, SOLUTION INTRAMUSCULAR; INTRAVENOUS at 09:45

## 2023-05-23 RX ADMIN — PROPOFOL 200 MG: 10 INJECTION, EMULSION INTRAVENOUS at 09:45

## 2023-05-23 ASSESSMENT — LIFESTYLE VARIABLES: TOBACCO_USE: 1

## 2023-05-23 NOTE — ANESTHESIA CARE TRANSFER NOTE
Patient: Mindy Guardado    Procedure: Procedure(s):  CYSTOURETEROSCOPY, WITH Retrograde Pyelogram Laser Lithotripsy CALCULUS REMOVAL AND URETERAL STENT INSERTION       Diagnosis: Calculus of proximal right ureter [N20.1]  Diagnosis Additional Information: No value filed.    Anesthesia Type:   General     Note:    Oropharynx: spontaneously breathing and oropharynx clear of all foreign objects  Level of Consciousness: awake and drowsy  Oxygen Supplementation: face mask  Level of Supplemental Oxygen (L/min / FiO2): 6  Independent Airway: airway patency satisfactory and stable  Dentition: dentition unchanged  Vital Signs Stable: post-procedure vital signs reviewed and stable  Report to RN Given: handoff report given  Patient transferred to: PACU    Handoff Report: Identifed the Patient, Identified the Reponsible Provider, Reviewed the pertinent medical history, Discussed the surgical course, Reviewed Intra-OP anesthesia mangement and issues during anesthesia, Set expectations for post-procedure period and Allowed opportunity for questions and acknowledgement of understanding      Vitals:  Vitals Value Taken Time   /76 05/23/23 1025   Temp 97  F (36.1  C) 05/23/23 1025   Pulse 76 05/23/23 1025   Resp 16 05/23/23 1025   SpO2 99 % 05/23/23 1025       Electronically Signed By: ALBERTINA Whitfield CRNA  May 23, 2023  10:27 AM

## 2023-05-23 NOTE — H&P
Urology H&P update    Name:  Mindy Guardado  MRN:  [id number]  Age/: 52 year old, 1970    CC: Right ureteral stone    HPI: Mindy Guardado is a(n) 52 year old female with history of right ureteral colic here for ureteroscopy.  Carmen does not note any change in her symptoms or her general condition since her last follow-up.  She does have some decrease in pain for the last 2 or 3 days but has not noted passage of stone    Past Medical History:  Past Medical History:   Diagnosis Date     PONV (postoperative nausea and vomiting)      Renal disease        Past Surgical History:  History reviewed. No pertinent surgical history.    Allergies:     Allergies   Allergen Reactions     Penicillins      Shellfish-Derived Products Hives     Azithromycin Rash     Possible allergy. Urticaria, rash 2 days after finishing Z-lexa treated at  ED  Possible allergy. Urticaria, rash 2 days after finishing Z-lexa treated at  ED       Metronidazole Rash     Flagyl  Flagyl       Oxycodone-Acetaminophen Rash and Nausea and Vomiting     Other reaction(s): Vomiting         Medications:  acetaminophen (TYLENOL) 500 MG tablet, Take 2 tablets (1,000 mg) by mouth every 6 hours as needed for pain  dimenhyDRINATE (DRAMAMINE) 50 MG CHEW, Take 1 tablet (50 mg) by mouth every 8 hours as needed for other (kidney stone pain)  ibuprofen (ADVIL/MOTRIN) 600 MG tablet, Take 1 tablet (600 mg) by mouth every 6 hours as needed for moderate pain    No current facility-administered medications on file prior to encounter.      Social History:  Social History     Socioeconomic History     Marital status:      Spouse name: Not on file     Number of children: Not on file     Years of education: Not on file     Highest education level: Not on file   Occupational History     Not on file   Tobacco Use     Smoking status: Every Day     Packs/day: 0.30     Types: Cigarettes     Smokeless tobacco: Never   Vaping Use     Vaping status: Never Used    Substance and Sexual Activity     Alcohol use: Not on file     Drug use: Never     Sexual activity: Not on file   Other Topics Concern     Not on file   Social History Narrative     Not on file     Social Determinants of Health     Financial Resource Strain: Not on file   Food Insecurity: Not on file   Transportation Needs: Not on file   Physical Activity: Not on file   Stress: Not on file   Social Connections: Not on file   Intimate Partner Violence: Not on file   Housing Stability: Not on file       Family History:  History reviewed. No pertinent family history.    ROS:  The remainder of the complete ROS was negative unless noted in the HPI.    Exam:  There were no vitals taken for this visit.  General: Alert, interactive, & in NAD  Resp: CTAB, no crackles or wheezes  Cardiac: Regular rate; extremities warm;   Abdomen: Soft, nontender, nondistended. .  : Normal   Extremities: No LE edema or obvious joint abnormalities  Skin: Warm and dry, no jaundice or rash    Labs:  All laboratory data reviewed      Assessment and Plan: Mindy Guardado is a(n) 52 year old female with right proximal/mid ureteral stone here for ureteroscopy.  Discussed with her about the procedure and noted that she does not have any significant change in her overall general health condition.  Okay to proceed ahead with ureteroscopy.      Ron Treviño MD  [facility]

## 2023-05-23 NOTE — ANESTHESIA PROCEDURE NOTES
Airway       Patient location during procedure: OR  Staff -        Anesthesiologist:  Carter Hoang MD       CRNA: Nissa Tinoco APRN CRNA       Performed By: CRNA  Consent for Airway        Urgency: elective  Indications and Patient Condition       Indications for airway management: [place]-procedural       Induction type:intravenous       Mask difficulty assessment: 0 - not attempted    Final Airway Details       Final airway type: supraglottic airway    Supraglottic Airway Details        Type: LMA       LMA size: 4    Post intubation assessment        Placement verified by: capnometry, equal breath sounds and chest rise        Number of attempts at approach: 1       Number of other approaches attempted: 0       Secured with: silk tape       Ease of procedure: easy       Dentition: Intact and Unchanged

## 2023-05-23 NOTE — ANESTHESIA PREPROCEDURE EVALUATION
Anesthesia Pre-Procedure Evaluation    Patient: Mindy Guardado   MRN: [id number] : 1970        Procedure : Procedure(s):  CYSTOURETEROSCOPY, WITH Retrograde Pyelogram Laser Lithotripsy CALCULUS REMOVAL AND URETERAL STENT INSERTION          Past Medical History:   Diagnosis Date     PONV (postoperative nausea and vomiting)      Renal disease       History reviewed. No pertinent surgical history.   Allergies   Allergen Reactions     Penicillins      Shellfish-Derived Products Hives     Azithromycin Rash     Possible allergy. Urticaria, rash 2 days after finishing Z-lexa treated at  ED  Possible allergy. Urticaria, rash 2 days after finishing Z-lexa treated at  ED       Metronidazole Rash     Flagyl  Flagyl       Oxycodone-Acetaminophen Rash and Nausea and Vomiting     Other reaction(s): Vomiting        Social History     Tobacco Use     Smoking status: Every Day     Packs/day: 0.30     Types: Cigarettes     Smokeless tobacco: Never   Vaping Use     Vaping status: Never Used   Substance Use Topics     Alcohol use: Not on file      Wt Readings from Last 1 Encounters:   23 74.8 kg (165 lb)        Anesthesia Evaluation   Pt has had prior anesthetic.     History of anesthetic complications  - PONV.      ROS/MED HX  ENT/Pulmonary:  - neg pulmonary ROS   (+) tobacco use,     Neurologic:  - neg neurologic ROS     Cardiovascular:  - neg cardiovascular ROS     METS/Exercise Tolerance: >4 METS    Hematologic:  - neg hematologic  ROS     Musculoskeletal:  - neg musculoskeletal ROS     GI/Hepatic:  - neg GI/hepatic ROS     Renal/Genitourinary:  - neg Renal ROS   (+) Nephrolithiasis ,     Endo: Comment: Incidental adrenal mass seen on CT - neg endo ROS   (+) Obesity (bmi 30),     Psychiatric/Substance Use:  - neg psychiatric ROS     Infectious Disease:  - neg infectious disease ROS     Malignancy:  - neg malignancy ROS     Other:  - neg other ROS          Physical Exam    Airway        Mallampati: II   TM distance:  > 3 FB   Neck ROM: full   Mouth opening: > 3 cm    Respiratory Devices and Support         Dental       (+) Minor Abnormalities - some fillings, tiny chips    B=Bridge, C=Chipped, L=Loose, M=Missing    Cardiovascular   cardiovascular exam normal          Pulmonary   pulmonary exam normal                OUTSIDE LABS:  CBC:   Lab Results   Component Value Date    WBC 14.4 (H) 05/10/2023    HGB 15.9 (H) 05/10/2023    HCT 46.8 05/10/2023     05/10/2023     BMP:   Lab Results   Component Value Date     05/10/2023     02/18/2019    POTASSIUM 4.5 05/10/2023    POTASSIUM 3.4 (L) 02/18/2019    CHLORIDE 106 05/10/2023    CHLORIDE 108 (H) 02/18/2019    CO2 22 05/10/2023    CO2 23 02/18/2019    BUN 16.5 05/10/2023    BUN 13 02/18/2019    CR 1.03 (H) 05/10/2023    CR 0.75 02/18/2019     (H) 05/10/2023    GLC 88 02/18/2019     COAGS: No results found for: PTT, INR, FIBR  POC: No results found for: BGM, HCG, HCGS  HEPATIC:   Lab Results   Component Value Date    ALBUMIN 4.2 05/10/2023    PROTTOTAL 7.1 05/10/2023    ALT 14 05/10/2023    AST 22 05/10/2023    ALKPHOS 63 05/10/2023    BILITOTAL 0.3 05/10/2023     OTHER:   Lab Results   Component Value Date    CED 9.4 05/10/2023    LIPASE 23 05/10/2023       Anesthesia Plan    ASA Status:  2   NPO Status:  NPO Appropriate    Anesthesia Type: General.     - Airway: LMA   Induction: Propofol.   Maintenance: TIVA.        Consents    Anesthesia Plan(s) and associated risks, benefits, and realistic alternatives discussed. Questions answered and patient/representative(s) expressed understanding.    - Discussed:     - Discussed with:  Patient         Postoperative Care    Pain management: Multi-modal analgesia.   PONV prophylaxis: Ondansetron (or other 5HT-3), Dexamethasone or Solumedrol     Comments:    Other Comments: Reviewed anesthetic options and risks, including risk of dental trauma. Patient agrees to proceed.             Carter Hoang MD

## 2023-05-23 NOTE — OP NOTE
OPERATIVE NOTE    PREOPERATIVE DIAGNOSIS:  Right-sided ureter stone    POSTOPERATIVE DIAGNOSIS:  Same    PROCEDURES PERFORMED:   1. Cystourethroscopy  2.  Right ureteroscopy  3.  Right retrograde pyelogram with interpretation of intraoperative fluoroscopic imaging  4.  Thulium fiber laser lithotripsy with basket stone extraction  5.  Right ureteral stent placement      STAFF SURGEON:  Dr. Ron Treviño MD, present for the entire case.     ANESTHESIA:  General    ESTIMATED BLOOD LOSS: 1 cc  DRAINS/TUBES:  6 Croatian x 24 cm double-J ureteral stent         IV FLUIDS:   Please see dictated anesthesia record  COMPLICATIONS:  None.   SPECIMEN:   Stones for analysis    SIGNIFICANT FINDINGS: Impacted proximal ureteral stone.  Proximal curl of the ureteral stent seen in the renal pelvis under fluoroscopy and distal curl seen in the bladder fluoroscopically and under direct vision.     BRIEF OPERATIVE INDICATIONS:  Mindy Guardado is a(n) 52 year old female with proximal ureteral stone.  After a discussion of all risks, benefits, and alternatives, the patient elected to proceed with definitive stone management. The patient understands the potential need for more than one procedure to eliminate all stone burden.     DESCRIPTION OF PROCEDURE:  After informed consent was obtained, the patient was transported to the operating room & placed supine on the table. Pneumoboots were applied.  After adequate anesthesia was induced, she was placed in lithotomy and prepped and draped in the usual sterile fashion. A timeout was taken to confirm correct patient, procedure and laterality. Pre-operative IV antibiotics were administered.     A 22-Croatian rigid cystoscope was inserted into a well-lubricated urethra. The anterior urethra was unremarkable. The right ureteral orifice was identified and  cannulated with a Sensor wire and 5 Croatian open-ended catheter. The wire passed without resistance into the upper pole and the 5-Croatian open ended  catheter was removed  After a retrograde pyelogram The Semirigid ureteroscope was used to identify the stone in the proximal ureter. A 200 micron laser fiber was used at a setting of 0.  8 J and 8 hz and lithotripsy was performed. A Halo basket was used to remove all fragments greater than 1 mm. Pullback ureteroscopy was performed and showed no retained stone fragments or ureteral injury. A 6 Singaporean 24-cm double-J stent was advanced over the Sensor wire, and a good proximal curl was seen in the renal pelvis fluoroscopically and the distal curl was seen in the bladder fluoroscopically and under direct vision. The bladder was drained.  The string was left intact on the stent.  The string on the stent was affixed to the pubic area with a Tegaderm.  The patient tolerated the procedure well and there were no apparent complications. The patient  was transported to the postanesthesia care unit in stable condition.     POST-OPERATIVE PLAN: Following recovery in the PACU, the patient can be discharged.  She will need to remove the ureteral stent as instructed in 1 week.  She will follow-up with us in 1 month.      Ron Treviño MD  [facility], Urology

## 2023-05-23 NOTE — DISCHARGE INSTRUCTIONS
You have received 975 mg of Acetaminophen (Tylenol) at 8:48am. Please do not take an additional dose of Tylenol until after 2:50pm.     Do not exceed 4,000 mg of acetaminophen during a 24 hour period and keep in mind that acetaminophen can also be found in many over-the-counter cold medications as well as narcotics that may be given for pain.     If you have any questions or concerns regarding your procedure, please contact Dr. Treviño, his office number is [phone].     Going Home With a Ureteral Stent    What is it?  A stent is a soft, plastic tube that helps urine (pee) drain into the bladder.  During the surgery, it is placed in the ureter the tube that connects the kidney to the bladder.  A thin curl at each end of the stent keeps one end in your kidney and the other in your bladder.  The stent can not be seen from outside of the body.    Why Do I Have It?  Some sort of blockage is not letting pee drain into your bladder.  This could be from a stone, certain surgeries or kidney infection.    What Should I Expect?   Stents often cause some discomfort. You may have:    The need to pee suddenly    Pain when you pee    A dull backache, which may get worse when you pee  Blood in your pee (color of fruit punch) and some clots, which may increase with physical activity.     What Can I Do To Feel Better?    Drink a little more fluids than usual.  You can eat your normal diet.    Enjoy a warm bath.  Decrease your activity.  Some people find bending or twisting movement cause discomfort or increased blood in the urine.  Even so, you will not harm yourself.     Kidney Stone [place]    Stent Removal With String    -Take Tylenol or Ibuprofen and drink fluids 1 hour prior to removing your stent at home.    -Remove the stent in the morning on the specific day as directed by your doctor.  Gently pull on the string in the shower while urinating until the stent is completely removed.    -Call [facility] if you have  "questions or concerns at [phone].    What To Expect After Your Stent Is Removed: REMOVE in 7 DAYS: Tuesday 30th    -After stent removal, urine may be bloody and possibly have some bloody clots.    -You may experience an \"achy\" pain due to urethral spasms.  This generally only lasts a few hours, but should resolve over the next 2-3 days    -Take Tylenol or Ibuprofen and drink fluids 1 hour prior to removing your stent at home.      "

## 2023-05-23 NOTE — ANESTHESIA POSTPROCEDURE EVALUATION
Patient: Mindy Guardado    Procedure: Procedure(s):  CYSTOURETEROSCOPY, WITH Retrograde Pyelogram Laser Lithotripsy CALCULUS REMOVAL AND URETERAL STENT INSERTION       Anesthesia Type:  General    Note:  Disposition: Outpatient   Postop Pain Control: Uneventful            Sign Out: Well controlled pain   PONV: No   Neuro/Psych: Uneventful            Sign Out: Acceptable/Baseline neuro status   Airway/Respiratory: Uneventful            Sign Out: Acceptable/Baseline resp. status   CV/Hemodynamics: Uneventful            Sign Out: Acceptable CV status; No obvious hypovolemia; No obvious fluid overload   Other NRE: NONE   DID A NON-ROUTINE EVENT OCCUR? No           Last vitals:  Vitals Value Taken Time   /74 05/23/23 1030   Temp 97  F (36.1  C) 05/23/23 1025   Pulse 69 05/23/23 1038   Resp 16 05/23/23 1030   SpO2 98 % 05/23/23 1038   Vitals shown include unvalidated device data.    Electronically Signed By: Carter Hoang MD  May 23, 2023  11:05 AM

## 2023-05-25 LAB
APPEARANCE STONE: NORMAL
COMPN STONE: NORMAL
SPECIMEN WT: 14 MG

## 2023-05-26 ENCOUNTER — NURSE TRIAGE (OUTPATIENT)
Dept: NURSING | Facility: CLINIC | Age: 53
End: 2023-05-26
Payer: COMMERCIAL

## 2023-05-26 NOTE — TELEPHONE ENCOUNTER
Nurse Triage SBAR    Is this a 2nd Level Triage? No    Situation/Background: Patient is calling with concern of ureter stent. Patient states she was advised to not take out until Tuesday. Patient reports that the wires are pulled out, patient states she is having burning pain when walking and would like to remove the stent. Patient states the wires are hanging outside the urethra as opposed to how they were previously placed.     Assessment:   Burning pain is mild to moderate with walking    Recommendation: Per disposition, Call PCP within 24 hours. Home care advice provided. Warm transferred patient to paging  to page on-call Urology Provider. Advised patient to call back with any new or worsening symptoms. Patient verbalized understanding and agrees with plan.    Urologist is Ron Treviño MD, located at [facility]  Warm transferred patient to paging  at: 5:56PM    Provider is to return page to patient.          Protocol Recommended Disposition: Call Provider within 24 hours/transferred patient to paging     Lindy Israel RN on 5/26/2023 at 5:30 PM  [facility]    Reason for Disposition    Ureteral stent accidentally came out    Additional Information    Negative: Shock suspected (e.g., cold/pale/clammy skin, too weak to stand, low BP, rapid pulse)    Negative: Sounds like a life-threatening emergency to the triager    Negative: [1] Back pain AND [2] NOT recently diagnosed with a kidney stone    Negative: [1] Flank pain (i.e., pain in the side, over the lower ribs or just below the ribs) AND [2] NOT recently diagnosed with a kidney stone    Negative: [1] Unable to urinate (or only a few drops) > 4 hours AND [2] bladder feels very full (e.g., palpable bladder or strong urge to urinate)    Negative: [1] SEVERE pain (e.g., excruciating, scale 8-10) AND [2] not improved after pain medicine    Negative: SEVERE vomiting    Negative: Fever > 103 F (39.4  C)    Negative: Severe chills (i.e., feeling extremely cold WITH shaking chills)    Negative: Patient sounds very sick or weak to the triager    Negative: [1] MODERATE pain (e.g., interferes with normal activities) AND [2] constant AND [3] lasting longer than 4 hours AND [4] NOT improved with pain medicine    Negative: Passing blood or large blood clots (i.e., size > a dime)  (Exception: pink or tea-colored urine, flecks or small strands of blood)    Negative: Fever > 100.4 F (38.0 C)    Negative: [1] Caller has URGENT question (includes prescribed medication questions) AND [2] triager unable to answer question    Negative: SEVERE burning or pain with passing urine (urination)    Negative: [1] MODERATE pain (e.g., interferes with normal activities) AND [2] pain comes and goes AND [3] present > 24 hours    Negative: Pregnant    Protocols used: KIDNEY STONE FOLLOW-UP CALL-A-

## 2023-05-31 ENCOUNTER — TELEPHONE (OUTPATIENT)
Dept: UROLOGY | Facility: CLINIC | Age: 53
End: 2023-05-31
Payer: COMMERCIAL

## 2023-05-31 NOTE — TELEPHONE ENCOUNTER
Message left for patient to call back to schedule one month post op vv w/o imaging.  Lakshmi Alvarez RN

## 2023-06-04 ENCOUNTER — HEALTH MAINTENANCE LETTER (OUTPATIENT)
Age: 53
End: 2023-06-04

## 2023-06-06 ENCOUNTER — TELEPHONE (OUTPATIENT)
Dept: UROLOGY | Facility: CLINIC | Age: 53
End: 2023-06-06
Payer: COMMERCIAL

## 2023-06-06 DIAGNOSIS — N20.1 CALCULUS OF PROXIMAL RIGHT URETER: Primary | ICD-10-CM

## 2023-06-06 DIAGNOSIS — R39.15 URINARY URGENCY: ICD-10-CM

## 2023-06-06 NOTE — TELEPHONE ENCOUNTER
Patient called back and is set up for one month post op.  She has been feeling urinary urgency, no fever, blood or pain.  Will check a ua/uc to rule out possible infection.  Lakshmi Alvarez RN

## 2023-06-09 ENCOUNTER — TELEPHONE (OUTPATIENT)
Dept: UROLOGY | Facility: CLINIC | Age: 53
End: 2023-06-09
Payer: COMMERCIAL

## 2023-06-09 NOTE — TELEPHONE ENCOUNTER
Message left for patient regarding her urine test results which are negative for infection.  Lakshmi Alvarez RN

## 2023-06-13 ENCOUNTER — TELEPHONE (OUTPATIENT)
Dept: FAMILY MEDICINE | Facility: CLINIC | Age: 53
End: 2023-06-13
Payer: COMMERCIAL

## 2023-06-13 NOTE — LETTER
June 13, 2023    To  Mindy Guardado  [address]  [place] [address]    Your team at [facility] cares about your health. We have reviewed your chart and based on our findings; we are making the following recommendations to better manage your health.     You are in particular need of attention regarding the following:     Schedule Annual MAMMOGRAPHY. [facility] scheduling number is [phone] or schedule in MyChart (self referral).  1 in 8 women will develop invasive breast cancer during her lifetime and it is the most common non-skin cancer in [place]. EARLY detection, new treatments, and a better understanding of the disease have increased survival rates- the 5 year survival rate in the 1960's was 63% and today it is close to 90%.  Schedule a primary care office visit with your provider for a Pap Smear to screen for Cervical Cancer.  Call or MyChart message your clinic to schedule a colonoscopy, schedule/ a FIT Test, or order a Cologuard test. If you are unsure what type of test you need, please call your clinic and speak to clinic staff.   Colon cancer is now the second leading cause of cancer-related deaths in the [place] for both men and women and there are over 130,000 new cases and 50,000 deaths per year from colon cancer. Colonoscopies can prevent 90-95% of these deaths. Problem lesions can be removed before they ever become cancer. This test is not only looking for cancer, but also getting rid of precancerous lesions.   PREVENTATIVE VISIT: Physical    If you have already completed these items, please contact the clinic via phone or   [facility] so your care team can review and update your records. Thank you for   choosing [facility] for your healthcare needs. For any questions,   concerns, or to schedule an appointment please contact our clinic.    Healthy Regards,      Your [facility] Care Team

## 2023-06-13 NOTE — TELEPHONE ENCOUNTER
Patient Quality Outreach    Patient is due for the following:   Colon Cancer Screening  Breast Cancer Screening - Mammogram  Cervical Cancer Screening - PAP Needed  Physical Preventive Adult Physical    Next Steps:   Schedule a Adult Preventative    Type of outreach:    Sent letter.    Next Steps:  Reach out within 90 days via Letter.    Max number of attempts reached: No. Will try again in 90 days if patient still on fail list.    Questions for provider review:    None           Glo Shaw, [facility]

## 2023-06-15 ENCOUNTER — HOSPITAL ENCOUNTER (OUTPATIENT)
Dept: MRI IMAGING | Facility: HOSPITAL | Age: 53
Discharge: HOME | End: 2023-06-15
Attending: NURSE PRACTITIONER | Admitting: NURSE PRACTITIONER
Payer: COMMERCIAL

## 2023-06-15 DIAGNOSIS — E27.8 ADRENAL MASS (H): ICD-10-CM

## 2023-06-15 PROCEDURE — A9585 GADOBUTROL INJECTION: HCPCS | Performed by: NURSE PRACTITIONER

## 2023-06-15 PROCEDURE — 74183 MRI ABD W/O CNTR FLWD CNTR: CPT

## 2023-06-15 PROCEDURE — 255N000002 HC RX 255 OP 636: Performed by: NURSE PRACTITIONER

## 2023-06-15 RX ORDER — GADOBUTROL 604.72 MG/ML
0.1 INJECTION INTRAVENOUS ONCE
Status: COMPLETED | OUTPATIENT
Start: 2023-06-15 | End: 2023-06-15

## 2023-06-15 RX ADMIN — GADOBUTROL 7 ML: 604.72 INJECTION INTRAVENOUS at 17:16

## 2023-06-16 NOTE — RESULT ENCOUNTER NOTE
Mario Guillen    Follow up with Endocrine as scheduled for evaluation to see if the adrenal adenomas are causing any hormone dysregulation. Follow up in clinic if you are having abnormal vaginal bleeding for the uterine fibroid. Hemangioma's on the liver are not concerning, follow up if you have any GI symptoms. Please let us know if you have any questions.     Take care,    ALBERTINA Bonilla CNP

## 2023-08-09 ENCOUNTER — VIRTUAL VISIT (OUTPATIENT)
Dept: UROLOGY | Facility: CLINIC | Age: 53
End: 2023-08-09
Payer: COMMERCIAL

## 2023-08-09 DIAGNOSIS — N20.1 CALCULUS OF PROXIMAL RIGHT URETER: Primary | ICD-10-CM

## 2023-08-09 DIAGNOSIS — D35.00 ADRENAL CORTICAL ADENOMA, UNSPECIFIED LATERALITY: ICD-10-CM

## 2023-08-09 PROCEDURE — 99214 OFFICE O/P EST MOD 30 MIN: CPT | Mod: VID | Performed by: STUDENT IN AN ORGANIZED HEALTH CARE EDUCATION/TRAINING PROGRAM

## 2023-08-09 ASSESSMENT — PAIN SCALES - GENERAL: PAINLEVEL: NO PAIN (0)

## 2023-08-09 NOTE — PROGRESS NOTES
Patient is roomed via telephone for a telehealth visit.  Patient confirmed she is in the [facility] at the time of this appointment.  Patient understand that this visit is billable and agree to proceed with appointment.

## 2023-08-09 NOTE — PROGRESS NOTES
Mindy is a 52 year old who is being evaluated via a billable video visit.      HVideo-Visit Details    Type of service:  Video Visit  Video start time 3:38 PM  Video end time: 3:48 PM    Originating Location (pt. Location): Home    Distant Location (provider location):  On-site  Platform used for Video Visit: My Own [employer]    HPI:  Mindy Guardado is a 52 year old female being seen for history of ureteral stone and bilateral adrenal incidentaloma.    Duration of problem: 3 months  Previous treatments: Ureteroscopic      Reviewed previous notes  Recently had MRI done  Doing well after the ureteroscopy, does have some concerns regarding incontinence after the procedure  Concerned about the adrenal adenomas         Review of Systems:  From intake questionnaire     Skin: negative  Eyes: negative  Ears/Nose/Throat: negative  Respiratory: No shortness of breath, dyspnea on exertion, cough, or hemoptysis  Cardiovascular: No chest pain or palpitations  Gastrointestinal: negative; no nausea/vomiting, constipation or diarrhea  Genitourinary: as per HPI  Musculoskeletal: negative  Neurologic: negative  Psychiatric: negative  Hematologic/Lymphatic/Immunologic: negative  Endocrine: negative         Physical Exam:   This is a virtual visit    Alert, no acute distress, oriented, conversant    Ears/nose/mouth: mouth:normal, good dentition  Respiratory: no respiratory distress, or pursed lip breathing  Cardiovascular:no obvious jugular venous distension present  Skin: no suspicious lesions or rashes on Visible body parts on the Screen  Neuro: Alert, oriented, speech and mentation normal  Psych: affect and mood normal, alert and oriented to person, place and time  Review of Imaging:  The following imaging exams were independently viewed and interpreted by me and discussed with patient:  MRI Abd/Pelvis: Abnormal:   Bilateral adrenal adenomas by MRI criteria  Left side larger than right but both are less than 4 cm in size    Review of  Labs:  The following labs were reviewed by me and discussed with the patient:  Stone analysis: Abnormal: Calcium oxalate    Assessment & Plan     Calculus of proximal right ureter  Calcium oxalate stone  First-time stone former  Preventive measures for oxalate stones discussed including increasing fluid intake and restricting salt in diet  Follow-up in 6 months with kidney ultrasound  - US Renal Complete Non-Vascular; Future    Adrenal cortical adenoma, unspecified laterality  Bilateral adrenal adenomas possibly nonfunctional  She does complain of sweating intermittently which could be also a result of her menopausal status  She does have endocrinology visit scheduled in March of next year  Ordering some screening tests in the meantime    - Cortisol; Future  - Catecholamines Fractionated; Future      Ron RAFFAELE Treviño MD  [facility]      ==========================    Additional Billing and Coding Information:  Review of external notes as documented above   Review of the result(s) of each unique test - Stone analysis, MRI adrenal glands    Independent interpretation of a test performed by another physician/other qualified health care professional (not separately reported) -       Discussion of management or test interpretation with external physician/other qualified healthcare professional/appropriate source -         20 minutes spent by me on the date of the encounter doing chart review, review of test results, interpretation of tests, patient visit, and documentation

## 2023-08-09 NOTE — PATIENT INSTRUCTIONS
We will order lab tests for her bilateral adrenal adenomas.  She should get them done on the next available date  She is scheduled to see endocrinology next year they will probably order additional testing.  Follow-up in 6 months with ultrasound kidneys

## 2023-08-16 ENCOUNTER — LAB (OUTPATIENT)
Dept: LAB | Facility: CLINIC | Age: 53
End: 2023-08-16
Payer: COMMERCIAL

## 2023-08-16 DIAGNOSIS — D35.00 ADRENAL CORTICAL ADENOMA, UNSPECIFIED LATERALITY: ICD-10-CM

## 2023-08-16 LAB — CORTIS SERPL-MCNC: 8 UG/DL

## 2023-08-16 PROCEDURE — 82533 TOTAL CORTISOL: CPT

## 2023-08-16 PROCEDURE — 36415 COLL VENOUS BLD VENIPUNCTURE: CPT

## 2023-08-24 ENCOUNTER — APPOINTMENT (OUTPATIENT)
Dept: LAB | Facility: CLINIC | Age: 53
End: 2023-08-24
Payer: COMMERCIAL

## 2023-08-24 PROCEDURE — 82384 ASSAY THREE CATECHOLAMINES: CPT | Mod: 90

## 2023-08-24 PROCEDURE — 36415 COLL VENOUS BLD VENIPUNCTURE: CPT | Mod: 90

## 2023-08-28 DIAGNOSIS — D35.00 ADRENAL CORTICAL ADENOMA, UNSPECIFIED LATERALITY: Primary | ICD-10-CM

## 2023-08-31 LAB
CATECHOLS PLAS-IMP: ABNORMAL
DOPAMINE SERPL-MCNC: 22 PG/ML
EPINEPH PLAS-MCNC: 48 PG/ML
NOREPINEPH PLAS-MCNC: 680 PG/ML

## 2024-02-15 ENCOUNTER — HOSPITAL ENCOUNTER (EMERGENCY)
Facility: HOSPITAL | Age: 54
Discharge: HOME | End: 2024-02-15
Admitting: EMERGENCY MEDICINE
Payer: COMMERCIAL

## 2024-02-15 ENCOUNTER — APPOINTMENT (OUTPATIENT)
Dept: RADIOLOGY | Facility: HOSPITAL | Age: 54
End: 2024-02-15
Attending: STUDENT IN AN ORGANIZED HEALTH CARE EDUCATION/TRAINING PROGRAM
Payer: COMMERCIAL

## 2024-02-15 VITALS
TEMPERATURE: 98.8 F | HEART RATE: 93 BPM | BODY MASS INDEX: 30 KG/M2 | RESPIRATION RATE: 22 BRPM | WEIGHT: 164 LBS | SYSTOLIC BLOOD PRESSURE: 130 MMHG | OXYGEN SATURATION: 99 % | DIASTOLIC BLOOD PRESSURE: 94 MMHG

## 2024-02-15 DIAGNOSIS — I44.7 LEFT BUNDLE BRANCH BLOCK: ICD-10-CM

## 2024-02-15 DIAGNOSIS — J06.9 VIRAL URI WITH COUGH: ICD-10-CM

## 2024-02-15 LAB
ANION GAP SERPL CALCULATED.3IONS-SCNC: 6 MMOL/L (ref 7–15)
BASOPHILS # BLD AUTO: 0.1 10E3/UL (ref 0–0.2)
BASOPHILS NFR BLD AUTO: 1 %
BUN SERPL-MCNC: 10 MG/DL (ref 6–20)
CALCIUM SERPL-MCNC: 9.3 MG/DL (ref 8.6–10)
CHLORIDE SERPL-SCNC: 106 MMOL/L (ref 98–107)
CREAT SERPL-MCNC: 0.73 MG/DL (ref 0.51–0.95)
D DIMER PPP FEU-MCNC: <0.27 UG/ML FEU (ref 0–0.5)
DEPRECATED HCO3 PLAS-SCNC: 29 MMOL/L (ref 22–29)
EGFRCR SERPLBLD CKD-EPI 2021: >90 ML/MIN/1.73M2
EOSINOPHIL # BLD AUTO: 0.1 10E3/UL (ref 0–0.7)
EOSINOPHIL NFR BLD AUTO: 1 %
ERYTHROCYTE [DISTWIDTH] IN BLOOD BY AUTOMATED COUNT: 13.1 % (ref 10–15)
FLUAV RNA SPEC QL NAA+PROBE: NEGATIVE
FLUBV RNA RESP QL NAA+PROBE: NEGATIVE
GLUCOSE SERPL-MCNC: 98 MG/DL (ref 70–99)
HCT VFR BLD AUTO: 46.1 % (ref 35–47)
HGB BLD-MCNC: 15.5 G/DL (ref 11.7–15.7)
HOLD SPECIMEN: NORMAL
IMM GRANULOCYTES # BLD: 0 10E3/UL
IMM GRANULOCYTES NFR BLD: 0 %
LYMPHOCYTES # BLD AUTO: 1.3 10E3/UL (ref 0.8–5.3)
LYMPHOCYTES NFR BLD AUTO: 14 %
MCH RBC QN AUTO: 31.2 PG (ref 26.5–33)
MCHC RBC AUTO-ENTMCNC: 33.6 G/DL (ref 31.5–36.5)
MCV RBC AUTO: 93 FL (ref 78–100)
MONOCYTES # BLD AUTO: 0.6 10E3/UL (ref 0–1.3)
MONOCYTES NFR BLD AUTO: 6 %
NEUTROPHILS # BLD AUTO: 7.5 10E3/UL (ref 1.6–8.3)
NEUTROPHILS NFR BLD AUTO: 78 %
NRBC # BLD AUTO: 0 10E3/UL
NRBC BLD AUTO-RTO: 0 /100
PLATELET # BLD AUTO: 298 10E3/UL (ref 150–450)
POTASSIUM SERPL-SCNC: 4.6 MMOL/L (ref 3.4–5.3)
RBC # BLD AUTO: 4.97 10E6/UL (ref 3.8–5.2)
RSV RNA SPEC NAA+PROBE: NEGATIVE
SARS-COV-2 RNA RESP QL NAA+PROBE: NEGATIVE
SODIUM SERPL-SCNC: 141 MMOL/L (ref 135–145)
TROPONIN T SERPL HS-MCNC: <6 NG/L
WBC # BLD AUTO: 9.6 10E3/UL (ref 4–11)

## 2024-02-15 PROCEDURE — 87637 SARSCOV2&INF A&B&RSV AMP PRB: CPT | Performed by: STUDENT IN AN ORGANIZED HEALTH CARE EDUCATION/TRAINING PROGRAM

## 2024-02-15 PROCEDURE — 96361 HYDRATE IV INFUSION ADD-ON: CPT

## 2024-02-15 PROCEDURE — 96375 TX/PRO/DX INJ NEW DRUG ADDON: CPT

## 2024-02-15 PROCEDURE — 258N000003 HC RX IP 258 OP 636: Performed by: STUDENT IN AN ORGANIZED HEALTH CARE EDUCATION/TRAINING PROGRAM

## 2024-02-15 PROCEDURE — 94640 AIRWAY INHALATION TREATMENT: CPT

## 2024-02-15 PROCEDURE — 84484 ASSAY OF TROPONIN QUANT: CPT | Performed by: STUDENT IN AN ORGANIZED HEALTH CARE EDUCATION/TRAINING PROGRAM

## 2024-02-15 PROCEDURE — 250N000009 HC RX 250: Performed by: STUDENT IN AN ORGANIZED HEALTH CARE EDUCATION/TRAINING PROGRAM

## 2024-02-15 PROCEDURE — 85025 COMPLETE CBC W/AUTO DIFF WBC: CPT | Performed by: STUDENT IN AN ORGANIZED HEALTH CARE EDUCATION/TRAINING PROGRAM

## 2024-02-15 PROCEDURE — 93005 ELECTROCARDIOGRAM TRACING: CPT | Performed by: STUDENT IN AN ORGANIZED HEALTH CARE EDUCATION/TRAINING PROGRAM

## 2024-02-15 PROCEDURE — 85379 FIBRIN DEGRADATION QUANT: CPT | Performed by: STUDENT IN AN ORGANIZED HEALTH CARE EDUCATION/TRAINING PROGRAM

## 2024-02-15 PROCEDURE — 99285 EMERGENCY DEPT VISIT HI MDM: CPT | Mod: 25

## 2024-02-15 PROCEDURE — 96374 THER/PROPH/DIAG INJ IV PUSH: CPT

## 2024-02-15 PROCEDURE — 36415 COLL VENOUS BLD VENIPUNCTURE: CPT | Performed by: STUDENT IN AN ORGANIZED HEALTH CARE EDUCATION/TRAINING PROGRAM

## 2024-02-15 PROCEDURE — 80048 BASIC METABOLIC PNL TOTAL CA: CPT | Performed by: STUDENT IN AN ORGANIZED HEALTH CARE EDUCATION/TRAINING PROGRAM

## 2024-02-15 PROCEDURE — 71046 X-RAY EXAM CHEST 2 VIEWS: CPT

## 2024-02-15 PROCEDURE — 250N000011 HC RX IP 250 OP 636: Mod: JZ | Performed by: STUDENT IN AN ORGANIZED HEALTH CARE EDUCATION/TRAINING PROGRAM

## 2024-02-15 RX ORDER — KETOROLAC TROMETHAMINE 15 MG/ML
15 INJECTION, SOLUTION INTRAMUSCULAR; INTRAVENOUS ONCE
Status: COMPLETED | OUTPATIENT
Start: 2024-02-15 | End: 2024-02-15

## 2024-02-15 RX ORDER — IPRATROPIUM BROMIDE AND ALBUTEROL SULFATE 2.5; .5 MG/3ML; MG/3ML
3 SOLUTION RESPIRATORY (INHALATION) ONCE
Status: COMPLETED | OUTPATIENT
Start: 2024-02-15 | End: 2024-02-15

## 2024-02-15 RX ORDER — DIPHENHYDRAMINE HYDROCHLORIDE 50 MG/ML
25 INJECTION INTRAMUSCULAR; INTRAVENOUS ONCE
Status: COMPLETED | OUTPATIENT
Start: 2024-02-15 | End: 2024-02-15

## 2024-02-15 RX ORDER — METOCLOPRAMIDE HYDROCHLORIDE 5 MG/ML
10 INJECTION INTRAMUSCULAR; INTRAVENOUS ONCE
Status: COMPLETED | OUTPATIENT
Start: 2024-02-15 | End: 2024-02-15

## 2024-02-15 RX ORDER — ALBUTEROL SULFATE 90 UG/1
2 AEROSOL, METERED RESPIRATORY (INHALATION) EVERY 4 HOURS PRN
Qty: 8 G | Refills: 0 | Status: SHIPPED | OUTPATIENT
Start: 2024-02-15

## 2024-02-15 RX ORDER — BENZONATATE 100 MG/1
100 CAPSULE ORAL 3 TIMES DAILY PRN
Qty: 15 CAPSULE | Refills: 0 | Status: SHIPPED | OUTPATIENT
Start: 2024-02-15 | End: 2024-02-22

## 2024-02-15 RX ADMIN — METOCLOPRAMIDE 10 MG: 5 INJECTION, SOLUTION INTRAMUSCULAR; INTRAVENOUS at 09:51

## 2024-02-15 RX ADMIN — KETOROLAC TROMETHAMINE 15 MG: 15 INJECTION, SOLUTION INTRAMUSCULAR; INTRAVENOUS at 09:56

## 2024-02-15 RX ADMIN — DIPHENHYDRAMINE HYDROCHLORIDE 25 MG: 50 INJECTION INTRAMUSCULAR; INTRAVENOUS at 09:54

## 2024-02-15 RX ADMIN — SODIUM CHLORIDE, POTASSIUM CHLORIDE, SODIUM LACTATE AND CALCIUM CHLORIDE 1000 ML: 600; 310; 30; 20 INJECTION, SOLUTION INTRAVENOUS at 09:51

## 2024-02-15 RX ADMIN — IPRATROPIUM BROMIDE AND ALBUTEROL SULFATE 3 ML: .5; 3 SOLUTION RESPIRATORY (INHALATION) at 10:39

## 2024-02-15 ASSESSMENT — ACTIVITIES OF DAILY LIVING (ADL): ADLS_ACUITY_SCORE: 36

## 2024-02-15 NOTE — ED PROVIDER NOTES
Emergency Department Encounter   NAME: Mindy Guardado ; AGE: 53 year old female ; YOB: 1970 ; MRN: [id number] ; PCP: No Ref-Primary, Physician   ED PROVIDER: Nilda Fernandez PA-C    Evaluation Date & Time:   2/15/2024  9:11 AM    CHIEF COMPLAINT:  Cough and Pharyngitis        Impression and Plan   FINAL IMPRESSION:    ICD-10-CM    1. Viral URI with cough  J06.9 Primary Care Referral      2. Left bundle branch block  I44.7 Rapid Access Clinic  Referral          MDM:  Mindy Guardado is a 53 year old female with PMH tobacco use, migraine headaches, and bilateral conductive hearing loss presenting to the emergency department for evaluation of productive cough, sore throat, chest pressure, and shortness of breath.  She states that shortness of breath and chest pain are primarily when taking a deep breath in. She also endorses a 9/10 headache, no vision changes, lightheadedness, weakness, or confusion. She she has had several migraine workups in the past and has tried many medications with no improvement of her headaches.    Vitals reviewed and unremarkable aside from a BP of 130/94. SpO2 99%. Pulse 93. On exam she is resting comfortably, not toxic appearing. She is speaking in full sentences. No increased work of breathing.  Mild expiratory wheezing present bilaterally in the upper lung lobes. Differential diagnosis includes but not limited to COVID, influenza, RSV, strep throat, other viral illness, pneumonia, PE, ACS. She was given a duoneb with significant improvement in chest pressure and cough. She was given Toradol, IV fluids, Reglan and Benadryl for headache with complete resolution of symptoms. Centor criteria was utilized and does not indicate any strep testing is warranted today. COVID, influenza, and RSV negative. Unable to use PERC criteria as patient is over 50 so D-dimer ordered. This was negative so chest x-ray was obtained and was negative for consolidations to indicate bacterial  "pneumonia or other acute findings. She is not tachycardic or hypoxic and I have overall low suspicion for PE. She is also afebrile and does not have a leukocytosis today. First HS troponin <6 and not significant for ACS. EKG found normal sinus rhythm with presence of new left bundle branch block, however, last EKG available for comparison is from 2010. No evidence of STEMI using Sgarbossa Criteria.  Suspect her symptoms today are most likely due to a viral URI.  I have given her a prescription for albuterol inhaler as well as Tessalon capsules and recommended daily Mucinex to help loosen chest congestion.  I have also placed a referral to cardiology rapid access clinic for follow-up regarding the \"new\" left bundle branch block.  I educated patient on concerning symptoms that would warrant return to the emergency department, she is understanding and agreeable to this plan.  She will call her primary care provider tomorrow to schedule follow-up next week.      Medical Decision Making    History:  Supplemental history from: Documented in chart  External Record(s) reviewed: Documented in chart    Work Up:  Chart documentation includes differential considered and any EKGs or imaging independently interpreted by provider, where specified.  In additional to work up documented, I considered the following work up: Documented in chart, if applicable.    External consultation:  Discussion of management with another provider: Documented in chart, if applicable    Complicating factors:  Care impacted by chronic illness: N/A  Care affected by social determinants of health: N/A    Disposition considerations: Discharge. I prescribed additional prescription strength medication(s) as charted. See documentation for any additional details.      ED COURSE:  9:30 AM I met and introduced myself to the patient. I gathered initial history and performed my physical exam. We discussed plan for initial workup.   12:20 PM I rechecked the " patient and discussed results, discharge, follow up, and reasons to return to the ED.     At the conclusion of the encounter I discussed the results of all the tests and the disposition. The questions were answered. The patient or family acknowledged understanding and was agreeable with the care plan.        MEDICATIONS GIVEN IN THE EMERGENCY DEPARTMENT:  Medications   ketorolac (TORADOL) injection 15 mg (15 mg Intravenous $Given 2/15/24 0956)   ipratropium - albuterol 0.5 mg/2.5 mg/3 mL (DUONEB) neb solution 3 mL (3 mLs Nebulization $Given 2/15/24 1039)   lactated ringers BOLUS 1,000 mL (0 mLs Intravenous Stopped 2/15/24 1040)   metoclopramide (REGLAN) injection 10 mg (10 mg Intravenous $Given 2/15/24 0951)   diphenhydrAMINE (BENADRYL) injection 25 mg (25 mg Intravenous $Given 2/15/24 0954)         NEW PRESCRIPTIONS STARTED AT TODAY'S ED VISIT:  Discharge Medication List as of 2/15/2024 12:26 PM        START taking these medications    Details   albuterol (PROAIR HFA) 108 (90 Base) MCG/ACT inhaler Inhale 2 puffs into the lungs every 4 hours as needed for shortness of breath, wheezing or cough, Disp-8 g, R-0, Local Print      benzonatate (TESSALON) 100 MG capsule Take 1 capsule (100 mg) by mouth 3 times daily as needed for cough, Disp-15 capsule, R-0, Local Print               HPI   Patient information was obtained from: patient  Use of Intrepreter: N/A     Mindy Guardado is a 53 year old female with PMH tobacco use, migraine headaches, and bilateral conductive hearing loss presenting to the emergency department for evaluation of productive cough, sore throat, chest pressure, and shortness of breath.  She states that shortness of breath and chest pain are primarily when taking a deep breath in. She also endorses a 9/10 headache, no vision changes, lightheadedness, weakness, or confusion. She she has had several migraine workups in the past and has tried many medications with no improvement of her  headaches.      Medical History     Past Medical History:   Diagnosis Date    PONV (postoperative nausea and vomiting)     Renal disease        Past Surgical History:   Procedure Laterality Date    COMBINED CYSTOSCOPY, INSERT STENT URETER(S) Right 5/23/2023    Procedure: CYSTOURETEROSCOPY, WITH Retrograde Pyelogram Laser Lithotripsy CALCULUS REMOVAL AND URETERAL STENT INSERTION;  Surgeon: Ron Treviño MD;  Location: [facility]       No family history on file.    Social History     Tobacco Use    Smoking status: Every Day     Packs/day: .3     Types: Cigarettes    Smokeless tobacco: Never   Vaping Use    Vaping Use: Never used   Substance Use Topics    Drug use: Never       albuterol (PROAIR HFA) 108 (90 Base) MCG/ACT inhaler  benzonatate (TESSALON) 100 MG capsule          Physical Exam     First Vitals:  Patient Vitals for the past 24 hrs:   BP Temp Pulse Resp SpO2 Weight   02/15/24 0907 (!) 130/94 98.8  F (37.1  C) 93 22 99 % 74.4 kg (164 lb)         PHYSICAL EXAM    General Appearance:  Alert, cooperative, no distress, appears stated age  HENT: Normocephalic without obvious deformity, atraumatic. Mucous membranes moist   Eyes: Conjunctiva clear, Lids normal. No discharge.   Respiratory: No distress. Lungs clear to ausculation bilaterally. No stridor. She is speaking in full sentences. No increased work of breathing.  Mild expiratory wheezing present bilaterally in the upper lung lobes.  Cardiovascular: Regular rate and rhythm, no murmur. Normal cap refill. No peripheral edema  GI: Abdomen soft, nontender  Musculoskeletal: Moving all extremities. No gross deformities  Integument: Warm, dry, no rashes or lesions  Neurologic: Alert and orientated x3. No focal deficits.  Psych: Normal mood and affect        Results     LAB:  All pertinent labs reviewed and interpreted  Labs Ordered and Resulted from Time of ED Arrival to Time of ED Departure   BASIC METABOLIC PANEL - Abnormal       Result Value     Sodium 141      Potassium 4.6      Chloride 106      Carbon Dioxide (CO2) 29      Anion Gap 6 (*)     Urea Nitrogen 10.0      Creatinine 0.73      GFR Estimate >90      Calcium 9.3      Glucose 98     INFLUENZA A/B, RSV, & SARS-COV2 PCR - Normal    Influenza A PCR Negative      Influenza B PCR Negative      RSV PCR Negative      SARS CoV2 PCR Negative     D DIMER QUANTITATIVE - Normal    D-Dimer Quantitative <0.27     TROPONIN T, HIGH SENSITIVITY - Normal    Troponin T, High Sensitivity <6     CBC WITH PLATELETS AND DIFFERENTIAL    WBC Count 9.6      RBC Count 4.97      Hemoglobin 15.5      Hematocrit 46.1      MCV 93      MCH 31.2      MCHC 33.6      RDW 13.1      Platelet Count 298      % Neutrophils 78      % Lymphocytes 14      % Monocytes 6      % Eosinophils 1      % Basophils 1      % Immature Granulocytes 0      NRBCs per 100 WBC 0      Absolute Neutrophils 7.5      Absolute Lymphocytes 1.3      Absolute Monocytes 0.6      Absolute Eosinophils 0.1      Absolute Basophils 0.1      Absolute Immature Granulocytes 0.0      Absolute NRBCs 0.0         RADIOLOGY:  Chest XR,  PA & LAT   Final Result   IMPRESSION: Strand of linear atelectasis in the right middle lobe and lingula unchanged. Lungs are otherwise clear. No pleural effusion. Heart size and pulmonary vascularity within normal limits.            ECG:  Performed at: 10:04    Impression: sinus rhythm with LBBB     Rate: 73  Rhythm: sinus  Axis: normal  WA Interval: 154  QRS Interval: 126  QTc Interval: 456  ST Changes: no ST changes  Comparison: LBBB new since 2010    EKG results reviewed and interpreted by Dr. Mccarthy, ED MD.             Nilda Fernandez PA-C   Emergency Medicine   [facility] EMERGENCY DEPARTMENT       Nilda Fernandez PA-C  02/15/24 2017

## 2024-02-15 NOTE — DISCHARGE INSTRUCTIONS
You were seen in the ER today for cough, sore throat, and chest pain.  Cardiac workup here today does not show any signs of heart attack. This did show a new left bundle branch block, however, the last EKG we have on file from you was from 2010 so it is unclear if this is new today.  I have placed a referral to the rapid access cardiology clinic for you they will call you to schedule follow-up to discuss this further.  I have also placed a referral for primary care provider for recheck in the next week if you still symptoms.  COVID, influenza, and RSV swabs are negative today. I suspect you likely have another viral illness that we do not test for.  Your chest x-ray is negative and does not show any signs of pneumonia.  I recommend picking up some Mucinex and taking this to help relieve some of your chest congestion. I have also given you Tessalon capsules for cough.    Your blood pressure was elevated at today's visit, please have this rechecked by your primary care provider.    Return to the emergency department if you develop any difficulty swallowing, difficulty breathing, or high fevers that you cannot control with Tylenol and ibuprofen.

## 2024-02-15 NOTE — ED TRIAGE NOTES
Patient arrives by private car for evaluation of cough since Friday and sore throat since yesterday.  Patient states cough is productive.

## 2024-02-16 ENCOUNTER — TELEPHONE (OUTPATIENT)
Dept: FAMILY MEDICINE | Facility: CLINIC | Age: 54
End: 2024-02-16
Payer: COMMERCIAL

## 2024-02-16 NOTE — TELEPHONE ENCOUNTER
Patient Quality Outreach    Patient is due for the following:   Colon Cancer Screening  Breast Cancer Screening - Mammogram  Cervical Cancer Screening - PAP Needed  Physical Preventive Adult Physical    Next Steps:   Schedule a Adult Preventative    Type of outreach:    Sent letter.    Next Steps:  Reach out within 90 days via Letter.    Max number of attempts reached: No. Will try again in 90 days if patient still on fail list.    Questions for provider review:    None           Glo Shaw, [facility]

## 2024-02-16 NOTE — LETTER
February 16, 2024    To  Mindy Guardado  [address] [place] [address]    Your team at [facility] cares about your health. We have reviewed your chart and based on our findings; we are making the following recommendations to better manage your health.     You are in particular need of attention regarding the following:     Schedule Annual MAMMOGRAPHY. [facility] scheduling number is [phone] or schedule in MyChart (self referral).  1 in 8 women will develop invasive breast cancer during her lifetime and it is the most common non-skin cancer in [place] Women. EARLY detection, new treatments, and a better understanding of the disease have increased survival rates- the 5 year survival rate in the 1960's was 63% and today it is close to 90%.  Schedule a primary care office visit with your provider for a Pap Smear to screen for Cervical Cancer.  Call or MyChart message your clinic to schedule a colonoscopy, schedule/ a FIT Test, or order a Cologuard test. If you are unsure what type of test you need, please call your clinic and speak to clinic staff.   Colon cancer is now the second leading cause of cancer-related deaths in the [place] for both men and women and there are over 130,000 new cases and 50,000 deaths per year from colon cancer. Colonoscopies can prevent 90-95% of these deaths. Problem lesions can be removed before they ever become cancer. This test is not only looking for cancer, but also getting rid of precancerous lesions.   PREVENTATIVE VISIT: Physical    If you have already completed these items, please contact the clinic via phone or   [employer] so your care team can review and update your records. Thank you for   choosing [facility] for your healthcare needs. For any questions,   concerns, or to schedule an appointment please contact our clinic.    Healthy Regards,      Your [facility] Care Team

## 2024-02-17 LAB
ATRIAL RATE - MUSE: 73 BPM
DIASTOLIC BLOOD PRESSURE - MUSE: NORMAL MMHG
INTERPRETATION ECG - MUSE: NORMAL
P AXIS - MUSE: 71 DEGREES
PR INTERVAL - MUSE: 154 MS
QRS DURATION - MUSE: 126 MS
QT - MUSE: 414 MS
QTC - MUSE: 456 MS
R AXIS - MUSE: 39 DEGREES
SYSTOLIC BLOOD PRESSURE - MUSE: NORMAL MMHG
T AXIS - MUSE: 82 DEGREES
VENTRICULAR RATE- MUSE: 73 BPM

## 2024-02-22 ENCOUNTER — OFFICE VISIT (OUTPATIENT)
Dept: CARDIOLOGY | Facility: CLINIC | Age: 54
End: 2024-02-22
Payer: COMMERCIAL

## 2024-02-22 VITALS
DIASTOLIC BLOOD PRESSURE: 86 MMHG | OXYGEN SATURATION: 97 % | BODY MASS INDEX: 29.63 KG/M2 | SYSTOLIC BLOOD PRESSURE: 128 MMHG | HEART RATE: 84 BPM | RESPIRATION RATE: 18 BRPM | WEIGHT: 162 LBS

## 2024-02-22 DIAGNOSIS — R05.8 POST-VIRAL COUGH SYNDROME: ICD-10-CM

## 2024-02-22 DIAGNOSIS — I44.7 LEFT BUNDLE BRANCH BLOCK: ICD-10-CM

## 2024-02-22 DIAGNOSIS — R07.89 ATYPICAL CHEST PAIN: ICD-10-CM

## 2024-02-22 DIAGNOSIS — R06.09 EXERTIONAL DYSPNEA: Primary | ICD-10-CM

## 2024-02-22 PROCEDURE — 99204 OFFICE O/P NEW MOD 45 MIN: CPT | Performed by: INTERNAL MEDICINE

## 2024-02-22 NOTE — PATIENT INSTRUCTIONS
Please contact direct nurses line Monday through Friday 8 AM to 5 PM @ [phone]    After-hours contact cardiology office at [phone].    Plan:    Echocardiogram.    Diagnosis: Left bundle branch block

## 2024-02-22 NOTE — PROGRESS NOTES
HEART CARE ENCOUNTER CONSULTATON NOTE      [facility]  [phone]      Assessment/Recommendations   Assessment:   Exertional dyspnea, new onset, progressive over the last few months.  New onset LBBB, since 2010.   3.  Atypical chest pain, since the development of an upper respiratory infection  4.  Intermittent lower extremity edema, intermittent  5.  Postviral symptoms including cough    Plan:   1.  Complete echocardiogram given dyspnea on exertion and new onset left bundle branch block  2.  If echocardiogram demonstrates reduction left ventricular ejection fraction recommend coronary CT angiogram given her age and pulmonary Spock  3.  If echocardiogram is normal would recommend patient closely follow symptoms and she has ongoing dyspnea after recovery from upper respiratory infection within recommend coronary CT angiogram    Patient will be called with results         History of Present Illness/Subjective    HPI: Mindy Guardado is a 53 year old female no prior cardiac history presents to cardiology clinic in consultation for new onset left bundle branch block since 2010.    Patient was seen in the emergency department for upper respiratory infection.  She was having cough sputum production and low-grade fevers.  This is also associated muscle aches.  She also noted over the past year that with going up and down stairs she feels short of breath.  She is an active smoker and felt her dyspnea was related to her smoking.    She does note occasional lower extremity edema.  She does also have intermittent episodes of chest pain since development of an upper respiratory infection which she feels related to severe coughing spells that she is having.  She has not had any syncopal events.    Patient does not have a strong family history of coronary artery disease.    Twelve-lead EKG was reviewed.  Demonstrated sinus rhythm.  Heart rate 73 bpm.  Left bundle branch block.  Compared to prior left bundle  branch block was new.  No Sgarbossa criteria for acute MI.  Troponin levels in the emergency department were normal.  D-dimer in the emergency department was normal.    Echocardiogram Results: Pending       Physical Examination  Review of Systems   Vitals: /86 (BP Location: Right arm, Patient Position: Sitting, Cuff Size: Adult Regular)   Pulse 84   Resp 18   Wt 73.5 kg (162 lb)   SpO2 97%   BMI 29.63 kg/m    BMI= Body mass index is 29.63 kg/m .  Wt Readings from Last 3 Encounters:   02/22/24 73.5 kg (162 lb)   02/15/24 74.4 kg (164 lb)   05/23/23 74.8 kg (165 lb)        Pleasant   ENT/Mouth: membranes moist, no oral lesions or bleeding gums.      EYES:  no scleral icterus, normal conjunctivae       Chest/Lungs:   No Rales.  Wheezing noted   Cardiovascular:   Regular. Normal first and second heart sounds with no murmurs, rubs, or gallops; the carotid, radial and posterior tibial pulses are intact, Jugular venous pressure normal, no edema bilaterally    Abdomen:  no tenderness; bowel sounds are present   Extremities: no cyanosis or clubbing   Skin: no xanthelasma, warm.    Neurologic: normal  bilateral, no tremors     Psychiatric: alert and oriented x3, calm        Please refer above for cardiac ROS details.        Medical History  Surgical History Family History Social History   Past Medical History:   Diagnosis Date    PONV (postoperative nausea and vomiting)     Renal disease      Past Surgical History:   Procedure Laterality Date    COMBINED CYSTOSCOPY, INSERT STENT URETER(S) Right 5/23/2023    Procedure: CYSTOURETEROSCOPY, WITH Retrograde Pyelogram Laser Lithotripsy CALCULUS REMOVAL AND URETERAL STENT INSERTION;  Surgeon: Ron Treviño MD;  Location: [facility] OR     No family history on file.     Social History     Socioeconomic History    Marital status:      Spouse name: Not on file    Number of children: Not on file    Years of education: Not on file    Highest education  "level: Not on file   Occupational History    Not on file   Tobacco Use    Smoking status: Every Day     Packs/day: .3     Types: Cigarettes    Smokeless tobacco: Never   Vaping Use    Vaping Use: Never used   Substance and Sexual Activity    Alcohol use: Not on file    Drug use: Never    Sexual activity: Not on file   Other Topics Concern    Not on file   Social History Narrative    Not on file     Social Determinants of Health     Financial Resource Strain: Not on file   Food Insecurity: Not on file   Transportation Needs: Not on file   Physical Activity: Not on file   Stress: Not on file   Social Connections: Not on file   Interpersonal Safety: Not on file   Housing Stability: Not on file           Medications  Allergies   Current Outpatient Medications   Medication Sig Dispense Refill    UNABLE TO FIND Take 1 teaspoonful by mouth every 4 hours as needed MEDICATION NAME: [place]      albuterol (PROAIR HFA) 108 (90 Base) MCG/ACT inhaler Inhale 2 puffs into the lungs every 4 hours as needed for shortness of breath, wheezing or cough (Patient not taking: Reported on 2/22/2024) 8 g 0       Allergies   Allergen Reactions    Shellfish-Derived Products Hives    Azithromycin Rash     Possible allergy. Urticaria, rash 2 days after finishing Z-lexa treated at  ED  Possible allergy. Urticaria, rash 2 days after finishing Z-lexa treated at  ED      Metronidazole Rash     Flagyl  Flagyl      Oxycodone-Acetaminophen Rash and Nausea and Vomiting     Other reaction(s): Vomiting      Penicillins Nausea and Rash          Lab Results    Chemistry/lipid CBC Cardiac Enzymes/BNP/TSH/INR   No results for input(s): \"CHOL\", \"HDL\", \"LDL\", \"TRIG\", \"CHOLHDLRATIO\" in the last 89799 hours.  No results for input(s): \"LDL\" in the last 59684 hours.  Recent Labs   Lab Test 02/15/24  0942      POTASSIUM 4.6   CHLORIDE 106   CO2 29   GLC 98   BUN 10.0   CR 0.73   GFRESTIMATED >90   CED 9.3     Recent Labs   Lab Test 02/15/24  0942 " "05/10/23  1206 02/18/19  1644   CR 0.73 1.03* 0.75     No results for input(s): \"A1C\" in the last 79815 hours.       Recent Labs   Lab Test 02/15/24  0942   WBC 9.6   HGB 15.5   HCT 46.1   MCV 93        Recent Labs   Lab Test 02/15/24  0942 05/10/23  1206   HGB 15.5 15.9*    No results for input(s): \"TROPONINI\" in the last 23753 hours.  No results for input(s): \"BNP\", \"NTBNPI\", \"NTBNP\" in the last 24738 hours.  No results for input(s): \"TSH\" in the last 68558 hours.  No results for input(s): \"INR\" in the last 22964 hours.     Juan Mcdowell DO    Reviewed ED notes.  Reviewed ED provider notes.  Reviewed ECG.  Reviewed ECG from 2010.  Reviewed chest x-ray demonstrated no evidence of cardiomegaly.  Reviewed labs.                                  "

## 2024-02-22 NOTE — LETTER
2/22/2024    [facility]  [address] [address]    RE: Mindy VERDUZCO Geo       Dear Colleague,     I had the pleasure of seeing Mindy Guardado in the [facility].    HEART CARE ENCOUNTER CONSULTATON NOTE      [facility]  [phone]      Assessment/Recommendations   Assessment:   Exertional dyspnea, new onset, progressive over the last few months.  New onset LBBB, since 2010.   3.  Atypical chest pain, since the development of an upper respiratory infection  4.  Intermittent lower extremity edema, intermittent  5.  Postviral symptoms including cough    Plan:   1.  Complete echocardiogram given dyspnea on exertion and new onset left bundle branch block  2.  If echocardiogram demonstrates reduction left ventricular ejection fraction recommend coronary CT angiogram given her age and pulmonary Spock  3.  If echocardiogram is normal would recommend patient closely follow symptoms and she has ongoing dyspnea after recovery from upper respiratory infection within recommend coronary CT angiogram    Patient will be called with results         History of Present Illness/Subjective    HPI: Mindy Guardado is a 53 year old female no prior cardiac history presents to cardiology clinic in consultation for new onset left bundle branch block since 2010.    Patient was seen in the emergency department for upper respiratory infection.  She was having cough sputum production and low-grade fevers.  This is also associated muscle aches.  She also noted over the past year that with going up and down stairs she feels short of breath.  She is an active smoker and felt her dyspnea was related to her smoking.    She does note occasional lower extremity edema.  She does also have intermittent episodes of chest pain since development of an upper respiratory infection which she feels related to severe coughing spells that she is having.  She has not had any syncopal  events.    Patient does not have a strong family history of coronary artery disease.    Twelve-lead EKG was reviewed.  Demonstrated sinus rhythm.  Heart rate 73 bpm.  Left bundle branch block.  Compared to prior left bundle branch block was new.  No Sgarbossa criteria for acute MI.  Troponin levels in the emergency department were normal.  D-dimer in the emergency department was normal.    Echocardiogram Results: Pending       Physical Examination  Review of Systems   Vitals: /86 (BP Location: Right arm, Patient Position: Sitting, Cuff Size: Adult Regular)   Pulse 84   Resp 18   Wt 73.5 kg (162 lb)   SpO2 97%   BMI 29.63 kg/m    BMI= Body mass index is 29.63 kg/m .  Wt Readings from Last 3 Encounters:   02/22/24 73.5 kg (162 lb)   02/15/24 74.4 kg (164 lb)   05/23/23 74.8 kg (165 lb)        Pleasant   ENT/Mouth: membranes moist, no oral lesions or bleeding gums.      EYES:  no scleral icterus, normal conjunctivae       Chest/Lungs:   No Rales.  Wheezing noted   Cardiovascular:   Regular. Normal first and second heart sounds with no murmurs, rubs, or gallops; the carotid, radial and posterior tibial pulses are intact, Jugular venous pressure normal, no edema bilaterally    Abdomen:  no tenderness; bowel sounds are present   Extremities: no cyanosis or clubbing   Skin: no xanthelasma, warm.    Neurologic: normal  bilateral, no tremors     Psychiatric: alert and oriented x3, calm        Please refer above for cardiac ROS details.        Medical History  Surgical History Family History Social History   Past Medical History:   Diagnosis Date    PONV (postoperative nausea and vomiting)     Renal disease      Past Surgical History:   Procedure Laterality Date    COMBINED CYSTOSCOPY, INSERT STENT URETER(S) Right 5/23/2023    Procedure: CYSTOURETEROSCOPY, WITH Retrograde Pyelogram Laser Lithotripsy CALCULUS REMOVAL AND URETERAL STENT INSERTION;  Surgeon: Ron Treviño MD;  [facility]: [facility] OR     " No family history on file.     Social History     Socioeconomic History    Marital status:      Spouse name: Not on file    Number of children: Not on file    Years of education: Not on file    Highest education level: Not on file   Occupational History    Not on file   Tobacco Use    Smoking status: Every Day     Packs/day: .3     Types: Cigarettes    Smokeless tobacco: Never   Vaping Use    Vaping Use: Never used   Substance and Sexual Activity    Alcohol use: Not on file    Drug use: Never    Sexual activity: Not on file   Other Topics Concern    Not on file   Social History Narrative    Not on file     Social Determinants of Health     Financial Resource Strain: Not on file   Food Insecurity: Not on file   Transportation Needs: Not on file   Physical Activity: Not on file   Stress: Not on file   Social Connections: Not on file   Interpersonal Safety: Not on file   Housing Stability: Not on file           Medications  Allergies   Current Outpatient Medications   Medication Sig Dispense Refill    UNABLE TO FIND Take 1 teaspoonful by mouth every 4 hours as needed MEDICATION NAME: Broncolin      albuterol (PROAIR HFA) 108 (90 Base) MCG/ACT inhaler Inhale 2 puffs into the lungs every 4 hours as needed for shortness of breath, wheezing or cough (Patient not taking: Reported on 2/22/2024) 8 g 0       Allergies   Allergen Reactions    Shellfish-Derived Products Hives    Azithromycin Rash     Possible allergy. Urticaria, rash 2 days after finishing Z-lexa treated at  ED  Possible allergy. Urticaria, rash 2 days after finishing Z-lexa treated at  ED      Metronidazole Rash     Flagyl  Flagyl      Oxycodone-Acetaminophen Rash and Nausea and Vomiting     Other reaction(s): Vomiting      Penicillins Nausea and Rash          Lab Results    Chemistry/lipid CBC Cardiac Enzymes/BNP/TSH/INR   No results for input(s): \"CHOL\", \"HDL\", \"LDL\", \"TRIG\", \"CHOLHDLRATIO\" in the last 81108 hours.  No results for input(s): \"LDL\" in " "the last 08876 hours.  Recent Labs   Lab Test 02/15/24  0942      POTASSIUM 4.6   CHLORIDE 106   CO2 29   GLC 98   BUN 10.0   CR 0.73   GFRESTIMATED >90   CED 9.3     Recent Labs   Lab Test 02/15/24  0942 05/10/23  1206 02/18/19  1644   CR 0.73 1.03* 0.75     No results for input(s): \"A1C\" in the last 84221 hours.       Recent Labs   Lab Test 02/15/24  0942   WBC 9.6   HGB 15.5   HCT 46.1   MCV 93        Recent Labs   Lab Test 02/15/24  0942 05/10/23  1206   HGB 15.5 15.9*    No results for input(s): \"TROPONINI\" in the last 39789 hours.  No results for input(s): \"BNP\", \"NTBNPI\", \"NTBNP\" in the last 35961 hours.  No results for input(s): \"TSH\" in the last 50277 hours.  No results for input(s): \"INR\" in the last 34670 hours.     Juan Mcdowell DO    Reviewed ED notes.  Reviewed ED provider notes.  Reviewed ECG.  Reviewed ECG from 2010.  Reviewed chest x-ray demonstrated no evidence of cardiomegaly.  Reviewed labs.        Thank you for allowing me to participate in the care of your patient.      Sincerely,     Juan Mcdowell DO     [facility]  cc:   Nilda Fernandez PA-C  [facility]  [address] [place] [address]      "

## 2024-02-23 ENCOUNTER — NURSE TRIAGE (OUTPATIENT)
Dept: NURSING | Facility: CLINIC | Age: 54
End: 2024-02-23
Payer: COMMERCIAL

## 2024-02-23 NOTE — TELEPHONE ENCOUNTER
S-(situation): cough    B-(background): Pt seen at ED on 2/15, the followed up with cardiology yesterday 2/22. Pt has not seen primary care.  Reports that coughing spasms are getting worse.    A-(assessment):   Worsening coughing spasms, benzonatate and albuterol inhaler are not helping. Last used albuterol inhaler yesterday, states she only uses the inhaler for chest tightness.  No SOB  Chest pain only when coughing  Dizzy after the coughing spell.    R-(recommendations):   FNA advised that she can use the inhaler PRN for cough. If not better with inhaler, get seen today at [facility] In clinic. FNA tried to connect her to  to st up PCP visit, but caller disconnected.    Brandy Be RN/Henry Nurse Advisor        Reason for Disposition   SEVERE coughing spells (e.g., whooping sound after coughing, vomiting after coughing)    Additional Information   Negative: Bluish (or gray) lips or face   Negative: SEVERE difficulty breathing (e.g., struggling for each breath, speaks in single words)   Negative: Rapid onset of cough and has hives   Negative: Coughing started suddenly after medicine, an allergic food or bee sting   Negative: Difficulty breathing after exposure to flames, smoke, or fumes   Negative: Sounds like a life-threatening emergency to the triager   Negative: Previous asthma attacks and this feels like asthma attack   Negative: Dry cough (non-productive; no sputum or minimal clear sputum) and within 14 days of COVID-19 Exposure   Negative: MODERATE difficulty breathing (e.g., speaks in phrases, SOB even at rest, pulse 100-120) and still present when not coughing   Negative: Chest pain present when not coughing   Negative: Passed out (i.e., fainted, collapsed and was not responding)   Negative: Patient sounds very sick or weak to the triager   Negative: MILD difficulty breathing (e.g., minimal/no SOB at rest, SOB with walking, pulse <100) and still present when not coughing   Negative:  Coughed up > 1 tablespoon (15 ml) blood (Exception: Blood-tinged sputum.)   Negative: Fever > 103 F (39.4 C)   Negative: Fever > 101 F (38.3 C) and over 60 years of age   Negative: Fever > 100.0 F (37.8 C) and has diabetes mellitus or a weak immune system (e.g., HIV positive, cancer chemotherapy, organ transplant, splenectomy, chronic steroids)   Negative: Fever > 100.0 F (37.8 C) and bedridden (e.g., CVA, chronic illness, recovering from surgery)   Negative: Increasing ankle swelling   Negative: Wheezing is present    Protocols used: Cough-A-OH

## 2024-02-26 ENCOUNTER — HOSPITAL ENCOUNTER (OUTPATIENT)
Dept: CARDIOLOGY | Facility: HOSPITAL | Age: 54
Discharge: HOME | End: 2024-02-26
Attending: INTERNAL MEDICINE | Admitting: INTERNAL MEDICINE
Payer: COMMERCIAL

## 2024-02-26 DIAGNOSIS — I44.7 LEFT BUNDLE BRANCH BLOCK: ICD-10-CM

## 2024-02-26 DIAGNOSIS — R06.09 EXERTIONAL DYSPNEA: ICD-10-CM

## 2024-02-26 DIAGNOSIS — R07.89 ATYPICAL CHEST PAIN: ICD-10-CM

## 2024-02-26 PROCEDURE — 93306 TTE W/DOPPLER COMPLETE: CPT | Mod: 26 | Performed by: INTERNAL MEDICINE

## 2024-02-26 PROCEDURE — 93306 TTE W/DOPPLER COMPLETE: CPT

## 2024-02-26 NOTE — RESULT ENCOUNTER NOTE
Please inform the patient that her echo demonstrated borderline normal left ventricular ejection fraction given these findings would recommend coronary CT angiogram to assess for obstructive coronary artery disease.  Patient does have left bundle branch block which could be the cause of her mild cardiomyopathy.
Yes - the patient is able to be screened

## 2024-02-28 ENCOUNTER — TELEPHONE (OUTPATIENT)
Dept: CARDIOLOGY | Facility: CLINIC | Age: 54
End: 2024-02-28
Payer: COMMERCIAL

## 2024-02-28 DIAGNOSIS — I44.7 LEFT BUNDLE BRANCH BLOCK: ICD-10-CM

## 2024-02-28 DIAGNOSIS — R06.09 EXERTIONAL DYSPNEA: Primary | ICD-10-CM

## 2024-02-28 NOTE — TELEPHONE ENCOUNTER
Health Call Center    Phone Message    May a detailed message be left on voicemail: yes     Reason for Call: Other: Mindy called requesting to speak with someone on her care team to discuss how Dr. Mcdowell would like her to proceed after having her echo this past Monday based on the results. Please reach out to Mindy to discuss. Thank you!     Action Taken: Other: Cardiology    Travel Screening: Not Applicable    Thank you!  [facility]

## 2024-02-28 NOTE — TELEPHONE ENCOUNTER
PC with patient and discussion of results, and recommendation for further testing. Verbalized understanding and agreeable to proceed. Order placed. Informed of scheduling steps. Staff message sent to specialty . Amrik direct office number if further questions. None at end of call. STELLA,Rn

## 2024-02-28 NOTE — TELEPHONE ENCOUNTER
Results copied into TE:      ===View-only below this line===  ----- Message -----  From: Juan Mcdowell DO  Sent: 2/26/2024   5:06 PM CST  To: Mandeep Kathleen RN    Please inform the patient that her echo demonstrated borderline normal left ventricular ejection fraction given these findings would recommend coronary CT angiogram to assess for obstructive coronary artery disease.  Patient does have left bundle branch block which could be the cause of her mild cardiomyopathy.    PC to patient to discuss results. No answer and LVM to return call. CMM,Rn

## 2024-03-15 ENCOUNTER — LAB (OUTPATIENT)
Dept: LAB | Facility: CLINIC | Age: 54
End: 2024-03-15
Payer: COMMERCIAL

## 2024-03-15 ENCOUNTER — OFFICE VISIT (OUTPATIENT)
Dept: ENDOCRINOLOGY | Facility: CLINIC | Age: 54
End: 2024-03-15
Attending: NURSE PRACTITIONER
Payer: COMMERCIAL

## 2024-03-15 VITALS
BODY MASS INDEX: 30.16 KG/M2 | SYSTOLIC BLOOD PRESSURE: 124 MMHG | DIASTOLIC BLOOD PRESSURE: 78 MMHG | WEIGHT: 164.9 LBS | OXYGEN SATURATION: 100 % | HEART RATE: 83 BPM

## 2024-03-15 DIAGNOSIS — E27.8 MASS OF BOTH ADRENAL GLANDS (H): ICD-10-CM

## 2024-03-15 DIAGNOSIS — R63.4 WEIGHT LOSS: ICD-10-CM

## 2024-03-15 DIAGNOSIS — E27.8 MASS OF BOTH ADRENAL GLANDS (H): Primary | ICD-10-CM

## 2024-03-15 PROCEDURE — 83835 ASSAY OF METANEPHRINES: CPT | Mod: 90

## 2024-03-15 PROCEDURE — 82088 ASSAY OF ALDOSTERONE: CPT

## 2024-03-15 PROCEDURE — 82024 ASSAY OF ACTH: CPT

## 2024-03-15 PROCEDURE — 84443 ASSAY THYROID STIM HORMONE: CPT

## 2024-03-15 PROCEDURE — 99205 OFFICE O/P NEW HI 60 MIN: CPT | Performed by: INTERNAL MEDICINE

## 2024-03-15 PROCEDURE — 82627 DEHYDROEPIANDROSTERONE: CPT

## 2024-03-15 PROCEDURE — 84403 ASSAY OF TOTAL TESTOSTERONE: CPT

## 2024-03-15 PROCEDURE — 36415 COLL VENOUS BLD VENIPUNCTURE: CPT

## 2024-03-15 PROCEDURE — 82533 TOTAL CORTISOL: CPT

## 2024-03-15 PROCEDURE — 99000 SPECIMEN HANDLING OFFICE-LAB: CPT

## 2024-03-15 PROCEDURE — 84439 ASSAY OF FREE THYROXINE: CPT

## 2024-03-15 PROCEDURE — 84244 ASSAY OF RENIN: CPT | Mod: 90

## 2024-03-15 RX ORDER — DEXAMETHASONE 1 MG
TABLET ORAL
Qty: 1 TABLET | Refills: 0 | Status: SHIPPED | OUTPATIENT
Start: 2024-03-15

## 2024-03-15 NOTE — PROGRESS NOTES
"  ENDOCRINOLOGY NEW PATIENT VISIT        HISTORY OF PRESENT ILLNESS    Mindy Guardado is seen in consultation at the request of Ramya Guardado NP for adrenal mass.    Patient was seen in the ED on 5/10/2023 with flank pain.  Imaging by CT of the abdomen revealed a right proximal ureteral stone with hydronephrosis as well as incidental finding of bilateral adrenal masses.  On that study, left adrenal contained a 4.1 cm heterogenous mass (this appears enlarged compared to 3.5 cm mass previously noted in 2012); right adrenal contained as a new 1.3 cm as well 3.4 cm heterogenous mass.  Also noted were uterine fibroids.    Patient was referred for dedicated MRI of the adrenals, performed on 6/15/2023.  -2.9 x 2.7 cm mass in the right adrenal gland which demonstrates signal dropout on the out of phase images.   -3.7 x 3.1 cm left adrenal mass which also demonstrates dropout of signal on the out of phase images. These lesions demonstrate minimal enhancement on postcontrast imaging. Additionally, the left adrenal mass has a nonenhancing T2 bright portion measuring 0.8 x 1.1 cm. Adrenal imaging findings most compatible with adenomas.  -Other findings: Uterine fibroids. Partially imaged 1.3 cm medial right hepatic lobe mass as well as a partially imaged 1.1 cm mass in the posterior right hepatic lobe. These were not imaged on the postcontrast images but could reflect hemangiomas. Left hepatic lobe cyst. Normal gallbladder.    CT of the abdomen performed in the [facility] system on 3/20/2012 showed the following: \"Stable (compared to 2/2012) low density left adrenal nodule measures 3.2 x 2.5 x 3 cm in maximal AP, transverse and craniocaudal dimensions. This is low density measuring approximately -5 Hounsfield units and has imaging characteristics most compatible with and adrenal adenoma. Right adrenal gland normal.\"    The patient had plasma catecholamines and cortisol level checked after urology visit in 8/2023, with results as " detailed below.    The patient does not have history of diabetes or hypertension.    She has not had significant weight gain in the past few years, in fact she notes that she has had weight loss of about 16 pounds in the past few months without change in diet.  Her weight records do not reflect this but we also did not have records of weights for patient between 2023 and 2024.    No purple striae.  Has had hirsutism which has been more pronounced for the past year: Shaves above the upper lip and on the chin daily.    She has had increasing headaches which are occurring almost daily.  Otherwise, no spells of palpitations.    No history of hypertension or hyperglycemia.  No history of hypokalemia.    Has been amenorrheic since around age 47.  Has had significant hot flashes since then, often has drenching sweats at night.  Prior to menopause, menstrual cycles occurred regularly every month.    Patient is , with 1 pregnancy ending electively at another in miscarriage.  No history of issues with fertility.    No known family history of adrenal nodules.  No known family history of congenital adrenal hyperplasia.    Other pertinent medical history:  -New development of exertional dyspnea, evaluated in cardiology in 2024 with discovery of left bundle branch block; referred for echocardiogram and coronary CT.  Echocardiogram performed on 2024 showed EF of 50 to 55% with abnormal septal motion likely related to altered electrical activation due to bundle branch block.  -History calcium oxalate ureteral stone, 1 instance of renal colic; followed in urology.    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    PAST MEDICAL HISTORY  Past Medical History:   Diagnosis Date    PONV (postoperative nausea and vomiting)     Renal disease        MEDICATIONS  Current Outpatient Medications   Medication Sig Dispense Refill    albuterol (PROAIR HFA) 108 (90  "Base) MCG/ACT inhaler Inhale 2 puffs into the lungs every 4 hours as needed for shortness of breath, wheezing or cough (Patient not taking: Reported on 2/22/2024) 8 g 0    UNABLE TO FIND Take 1 teaspoonful by mouth every 4 hours as needed MEDICATION NAME: Broncolin (Patient not taking: Reported on 3/15/2024)         Allergies, family, and social history were reviewed and documented as needed in EHR.     REVIEW OF SYSTEMS  A complete 10-point ROS was performed and pertinent positives and negatives are noted in the HPI. ROS is also positive for the following: cramping abdominal pain, followed by loose \"paste\"-like stools occurring for the past 6 years, as often as 4-5 times a month.    PHYSICAL EXAM  /78 (BP Location: Right arm, Patient Position: Sitting, Cuff Size: Adult Regular)   Pulse 83   Wt 74.8 kg (164 lb 14.4 oz)   SpO2 100%   BMI 30.16 kg/m    Body mass index is 30.16 kg/m .  Constitutional: Vital signs reviewed, as recorded above. Patient is alert, oriented and appears in no acute distress.  Eyes: PER, EOMI, no stare, lid lag, or retraction; no conjunctival injection.  ENMT: OP clear with moist MM. Lips are without lesions.   Neck: Neck supple, no palpable thyromegaly.  Lymphatic: No cervical or supraclavicular LAD.  Cardiovascular: RRR, normal S1/S2, no audible murmurs, rubs or gallops; DP pulses present and symmetric.  Respiratory: CTAB, without wheezes, crackles or rhonchi; normal chest wall motion and respiratory effort.  GI: Positive bowel sounds, soft, NT/ND.  MSK: No clubbing or cyanosis; normal muscle bulk and tone.  Skin: Normal skin color, temperature, turgor and texture, no purple striae.  Neurological: Alert and oriented times 3. No tremor.      DATA REVIEW  Each of the following laboratory and/or imaging studies were reviewed.    Prior imaging as in HPI.            ASSESSMENT  1.  Bilateral adrenal masses.  Differential includes bilateral nonfunctioning adenomas, functional adenoma(s), " PBMAH, less likely pheochromocytoma or CAH.  I reviewed images of CT and MRI studies from 2023 personally.  CT phenotype cannot be completely assessed in study from 5/2023 since it is a contrast study.  However, adrenal masses appear mildly heterogenous and well-circumscribed.  MRI features are suggestive of adrenal adenomas.  MRI phenotype as well as presence of left adrenal mass on prior study in 2012 (although left adrenal mass is slightly more prominent on recent study) supports benignity.  Would evaluate for functionality.  Discussed differential diagnosis and proposed testing in detail.  If hormonal studies are unremarkable, we will anticipate follow-up in the summer 2024, with CT adrenal protocol to be completed prior to visit.    2.  Loose stools, weight loss and heat intolerance/night sweats.  Would screen for hyperthyroidism.  Has had evaluation in GI and, per patient, normal colonoscopy.    3.  Hirsutism.  Relatively newer onset.  As above, evaluate adrenal androgens.  Also check testosterone levels.    4.  Tobacco use.  Smoking about 1 pack over the course of 3 days.  Not ready to contemplate quit yet.    PLAN  -Labs today  -Perform the dexamethasone suppression test as follows:  1. Take 1 mg dexamethasone tablet at 11 PM.  2. The next morning, go to lab to have blood draw at 8 AM. You should arrive in lab by 7:45 AM to be sure your blood is drawn by 8 AM.  The prescription for dexamethasone will be sent to pharmacy.  -Return for a follow-up visit in summer 2024  -We will communicate results via [facility], or if needed by phone      Orders Placed This Encounter   Procedures    Adrenal corticotropin    Cortisol    TSH    T4 free    Metanephrines Plasma Free    Aldosterone urine    DHEA sulfate    Testosterone total    Aldosterone Renin Ratio       I spent a total of 74 minutes on the date of encounter reviewing medical records, evaluating the patient, coordinating care and documenting in the EHR, as  detailed above.      Keo Glass MD   Division of Diabetes, Endocrinology and Metabolism  Department of Medicine      cc: Ramya Guardado NP

## 2024-03-15 NOTE — PATIENT INSTRUCTIONS
-Labs today  -Perform the dexamethasone suppression test as follows:  1. Take 1 mg dexamethasone tablet at 11 PM.  2. The next morning, go to lab to have blood draw at 8 AM. You should arrive in lab by 7:45 AM to be sure your blood is drawn by 8 AM.  The prescription for dexamethasone will be sent to pharmacy.  -Return for a follow-up visit in summer 2024  -We will communicate results via [facility], or if needed by phone

## 2024-03-16 LAB
CORTIS SERPL-MCNC: 6.4 UG/DL
T4 FREE SERPL-MCNC: 1.25 NG/DL (ref 0.9–1.7)
TSH SERPL DL<=0.005 MIU/L-ACNC: 1.41 UIU/ML (ref 0.3–4.2)

## 2024-03-18 LAB
ANNOTATION COMMENT IMP: NORMAL
DHEA-S SERPL-MCNC: 45 UG/DL (ref 35–430)
METANEPHS SERPL-SCNC: <0.1 NMOL/L
NORMETANEPHRINE SERPL-SCNC: 0.72 NMOL/L
RENIN PLAS-CCNC: 0.9 NG/ML/HR

## 2024-03-19 LAB
ALDOST SERPL-MCNC: 5.5 NG/DL (ref 0–31)
ALDOST/RENIN PLAS-RTO: 6.1 {RATIO} (ref 0–25)
TESTOST SERPL-MCNC: 49 NG/DL (ref 8–60)

## 2024-03-21 ENCOUNTER — HOSPITAL ENCOUNTER (OUTPATIENT)
Dept: CT IMAGING | Facility: CLINIC | Age: 54
Discharge: HOME | End: 2024-03-21
Attending: INTERNAL MEDICINE | Admitting: INTERNAL MEDICINE
Payer: COMMERCIAL

## 2024-03-21 VITALS — DIASTOLIC BLOOD PRESSURE: 65 MMHG | SYSTOLIC BLOOD PRESSURE: 119 MMHG

## 2024-03-21 DIAGNOSIS — R06.09 EXERTIONAL DYSPNEA: ICD-10-CM

## 2024-03-21 DIAGNOSIS — I44.7 LEFT BUNDLE BRANCH BLOCK: ICD-10-CM

## 2024-03-21 LAB
ACTH PLAS-MCNC: <10 PG/ML
BSA FOR ECHO PROCEDURE: 1.81 M2
CCTA ASCENDING AORTA: 3.2
CCTA SINUS: 3.2
CREAT BLD-MCNC: 0.7 MG/DL (ref 0.6–1.1)
EGFRCR SERPLBLD CKD-EPI 2021: >60 ML/MIN/1.73M2

## 2024-03-21 PROCEDURE — 250N000009 HC RX 250: Performed by: INTERNAL MEDICINE

## 2024-03-21 PROCEDURE — 75574 CT ANGIO HRT W/3D IMAGE: CPT | Mod: 26 | Performed by: GENERAL ACUTE CARE HOSPITAL

## 2024-03-21 PROCEDURE — 82565 ASSAY OF CREATININE: CPT

## 2024-03-21 PROCEDURE — 75574 CT ANGIO HRT W/3D IMAGE: CPT

## 2024-03-21 PROCEDURE — 250N000011 HC RX IP 250 OP 636: Performed by: INTERNAL MEDICINE

## 2024-03-21 PROCEDURE — 250N000013 HC RX MED GY IP 250 OP 250 PS 637: Performed by: INTERNAL MEDICINE

## 2024-03-21 RX ORDER — IOPAMIDOL 755 MG/ML
100 INJECTION, SOLUTION INTRAVASCULAR ONCE
Status: COMPLETED | OUTPATIENT
Start: 2024-03-21 | End: 2024-03-21

## 2024-03-21 RX ORDER — METOPROLOL TARTRATE 1 MG/ML
5 INJECTION, SOLUTION INTRAVENOUS
Status: DISCONTINUED | OUTPATIENT
Start: 2024-03-21 | End: 2024-03-22 | Stop reason: HOSPADM

## 2024-03-21 RX ORDER — DILTIAZEM HYDROCHLORIDE 5 MG/ML
5 INJECTION INTRAVENOUS
Status: DISCONTINUED | OUTPATIENT
Start: 2024-03-21 | End: 2024-03-22 | Stop reason: HOSPADM

## 2024-03-21 RX ORDER — NITROGLYCERIN 0.4 MG/1
0.4 TABLET SUBLINGUAL ONCE
Status: COMPLETED | OUTPATIENT
Start: 2024-03-21 | End: 2024-03-21

## 2024-03-21 RX ORDER — DILTIAZEM HYDROCHLORIDE 5 MG/ML
10 INJECTION INTRAVENOUS
Status: DISCONTINUED | OUTPATIENT
Start: 2024-03-21 | End: 2024-03-22 | Stop reason: HOSPADM

## 2024-03-21 RX ADMIN — METOPROLOL TARTRATE 5 MG: 5 INJECTION INTRAVENOUS at 07:01

## 2024-03-21 RX ADMIN — NITROGLYCERIN 0.4 MG: 0.4 TABLET SUBLINGUAL at 06:58

## 2024-03-21 RX ADMIN — IOPAMIDOL 100 ML: 755 INJECTION, SOLUTION INTRAVENOUS at 07:06

## 2024-03-25 DIAGNOSIS — R05.8 POST-VIRAL COUGH SYNDROME: ICD-10-CM

## 2024-03-25 DIAGNOSIS — J40 BRONCHITIS: ICD-10-CM

## 2024-03-25 DIAGNOSIS — R06.09 EXERTIONAL DYSPNEA: Primary | ICD-10-CM

## 2024-03-26 ENCOUNTER — LAB (OUTPATIENT)
Dept: LAB | Facility: CLINIC | Age: 54
End: 2024-03-26
Payer: COMMERCIAL

## 2024-03-26 DIAGNOSIS — E27.8 MASS OF BOTH ADRENAL GLANDS (H): ICD-10-CM

## 2024-03-26 DIAGNOSIS — R63.4 WEIGHT LOSS: ICD-10-CM

## 2024-03-26 LAB
CORTIS SERPL-MCNC: 1.6 UG/DL
Lab: NORMAL
PERFORMING LABORATORY: NORMAL
SPECIMEN STATUS: NORMAL
TEST NAME: NORMAL

## 2024-03-26 PROCEDURE — 80299 QUANTITATIVE ASSAY DRUG: CPT

## 2024-03-26 PROCEDURE — 36415 COLL VENOUS BLD VENIPUNCTURE: CPT

## 2024-03-26 PROCEDURE — 82533 TOTAL CORTISOL: CPT

## 2024-03-29 LAB — MISCELLANEOUS TEST 1 (ARUP): NORMAL

## 2024-04-02 ENCOUNTER — MYC MEDICAL ADVICE (OUTPATIENT)
Dept: ENDOCRINOLOGY | Facility: CLINIC | Age: 54
End: 2024-04-02
Payer: COMMERCIAL

## 2024-07-20 ENCOUNTER — HEALTH MAINTENANCE LETTER (OUTPATIENT)
Age: 54
End: 2024-07-20

## 2025-06-08 ENCOUNTER — HEALTH MAINTENANCE LETTER (OUTPATIENT)
Age: 55
End: 2025-06-08

## 2025-08-09 ENCOUNTER — HEALTH MAINTENANCE LETTER (OUTPATIENT)
Age: 55
End: 2025-08-09